# Patient Record
Sex: FEMALE | Race: WHITE | NOT HISPANIC OR LATINO | Employment: FULL TIME | URBAN - METROPOLITAN AREA
[De-identification: names, ages, dates, MRNs, and addresses within clinical notes are randomized per-mention and may not be internally consistent; named-entity substitution may affect disease eponyms.]

---

## 2023-02-01 ENCOUNTER — OFFICE VISIT (OUTPATIENT)
Dept: URGENT CARE | Facility: CLINIC | Age: 23
End: 2023-02-01
Payer: COMMERCIAL

## 2023-02-01 VITALS
TEMPERATURE: 98 F | RESPIRATION RATE: 18 BRPM | WEIGHT: 135 LBS | DIASTOLIC BLOOD PRESSURE: 73 MMHG | HEIGHT: 60 IN | HEART RATE: 75 BPM | OXYGEN SATURATION: 100 % | BODY MASS INDEX: 26.5 KG/M2 | SYSTOLIC BLOOD PRESSURE: 111 MMHG

## 2023-02-01 DIAGNOSIS — R52 GENERALIZED BODY ACHES: ICD-10-CM

## 2023-02-01 DIAGNOSIS — Z11.59 SCREENING FOR VIRAL DISEASE: ICD-10-CM

## 2023-02-01 DIAGNOSIS — R09.81 COUGH WITH CONGESTION OF PARANASAL SINUS: ICD-10-CM

## 2023-02-01 DIAGNOSIS — Z11.52 ENCOUNTER FOR SCREENING FOR COVID-19: ICD-10-CM

## 2023-02-01 DIAGNOSIS — R05.8 COUGH WITH CONGESTION OF PARANASAL SINUS: ICD-10-CM

## 2023-02-01 DIAGNOSIS — R68.83 CHILLS (WITHOUT FEVER): ICD-10-CM

## 2023-02-01 DIAGNOSIS — J06.9 VIRAL URI WITH COUGH: Primary | ICD-10-CM

## 2023-02-01 DIAGNOSIS — Z77.120 MOLD SUSPECTED EXPOSURE: ICD-10-CM

## 2023-02-01 LAB
CTP QC/QA: YES
CTP QC/QA: YES
HETEROPH AB SER QL: NEGATIVE
SARS-COV-2 AG RESP QL IA.RAPID: NEGATIVE

## 2023-02-01 PROCEDURE — 1159F PR MEDICATION LIST DOCUMENTED IN MEDICAL RECORD: ICD-10-PCS | Mod: CPTII,S$GLB,, | Performed by: PHYSICIAN ASSISTANT

## 2023-02-01 PROCEDURE — 87811 SARS-COV-2 COVID19 W/OPTIC: CPT | Mod: QW,S$GLB,, | Performed by: PHYSICIAN ASSISTANT

## 2023-02-01 PROCEDURE — 99204 PR OFFICE/OUTPT VISIT, NEW, LEVL IV, 45-59 MIN: ICD-10-PCS | Mod: S$GLB,,, | Performed by: PHYSICIAN ASSISTANT

## 2023-02-01 PROCEDURE — 3078F PR MOST RECENT DIASTOLIC BLOOD PRESSURE < 80 MM HG: ICD-10-PCS | Mod: CPTII,S$GLB,, | Performed by: PHYSICIAN ASSISTANT

## 2023-02-01 PROCEDURE — 3074F PR MOST RECENT SYSTOLIC BLOOD PRESSURE < 130 MM HG: ICD-10-PCS | Mod: CPTII,S$GLB,, | Performed by: PHYSICIAN ASSISTANT

## 2023-02-01 PROCEDURE — 86308 POCT INFECTIOUS MONONUCLEOSIS: ICD-10-PCS | Mod: QW,S$GLB,, | Performed by: PHYSICIAN ASSISTANT

## 2023-02-01 PROCEDURE — 86308 HETEROPHILE ANTIBODY SCREEN: CPT | Mod: QW,S$GLB,, | Performed by: PHYSICIAN ASSISTANT

## 2023-02-01 PROCEDURE — 3078F DIAST BP <80 MM HG: CPT | Mod: CPTII,S$GLB,, | Performed by: PHYSICIAN ASSISTANT

## 2023-02-01 PROCEDURE — 3074F SYST BP LT 130 MM HG: CPT | Mod: CPTII,S$GLB,, | Performed by: PHYSICIAN ASSISTANT

## 2023-02-01 PROCEDURE — 3008F PR BODY MASS INDEX (BMI) DOCUMENTED: ICD-10-PCS | Mod: CPTII,S$GLB,, | Performed by: PHYSICIAN ASSISTANT

## 2023-02-01 PROCEDURE — 1159F MED LIST DOCD IN RCRD: CPT | Mod: CPTII,S$GLB,, | Performed by: PHYSICIAN ASSISTANT

## 2023-02-01 PROCEDURE — 99204 OFFICE O/P NEW MOD 45 MIN: CPT | Mod: S$GLB,,, | Performed by: PHYSICIAN ASSISTANT

## 2023-02-01 PROCEDURE — 87811 SARS CORONAVIRUS 2 ANTIGEN POCT, MANUAL READ: ICD-10-PCS | Mod: QW,S$GLB,, | Performed by: PHYSICIAN ASSISTANT

## 2023-02-01 PROCEDURE — 3008F BODY MASS INDEX DOCD: CPT | Mod: CPTII,S$GLB,, | Performed by: PHYSICIAN ASSISTANT

## 2023-02-01 NOTE — PATIENT INSTRUCTIONS
PLEASE READ YOUR DISCHARGE INSTRUCTIONS ENTIRELY AS IT CONTAINS IMPORTANT INFORMATION.    -Please call [facility] scheduling center [phone] to set up appointment for PCP/specialty clinic for continued workup and management.  Referral was placed for evaluation with Allergy.      Patient had covid testing done today.    Discussed corona virus precautions and reviewed [facility] FAC; printed a copy for patient.  I discussed to continue to monitor their symptoms. Discussed that if their symptoms persist or worsen to seek re-evaluation. Clinic vs. ER precautions were given.  Patient verbalized understanding and agreed with the entire plan of care.    If Negative and no direct exposure: symptom free without fever reducing meds in 24 hours - can go back to work in 24 hours with surgical mask for 10-14 days.      - Reviewed radiographs and all diagnostic testing with patient/family.    - Rest.  Drink plenty of fluids.    - Tylenol OR anti-inflammatory (NSAIDs, ibuprofen, aleve, motrin) as directed as needed for fever/pain.  For Tylenol, do not exceed 3000 mg/ day. If no contraindication or allergies.  -OK to supplement with OTC DayQuil, NyQuil or TheraFlu every 6 hours as needed for cough and congestion.  Use caution of total amount of Tylenol/acetaminophen per day.      -Below are suggestions for symptomatic relief:              -Salt water gargles to soothe throat pain.              -Chloroseptic spray also helps to numb throat pain. Drink hot tea with honey or lemon to soothe your throat.              -Nasal saline spray reduces inflammation and dryness.              -Warm face compresses to help with facial sinus pain/pressure.              -Vicks vapor rub at night.              -**may also supplement with OTC nasal spray to help with inflammation and congestion.   Wean to off when you nose becomes to dry or bleed. Also use nasal saline twice a day to help with dryness.               -Flonase OTC or Nasacort OTC  once or  twice a day for nasal/sinus congestion. DON'T USE IF YOU HAVE GLAUCOMA. CHECK WITH YOUR PHARMACIST/PHYSICIAN.              -Simple foods like chicken noodle soup.              -Mucinex DM (ANY COUGH EXPECTORANT-- guaifenesin) for cough or chest congestion with mucus and (ANY COUGH SUPPRESSANT- dextromethorphan) helps with coughing every 12 hours. Mucinex-DM if you have chest congestion or sputum (caution if history of high blood pressure or palpitations).              -Zyrtec/Claritin/xyzal during the day time  & Benadryl at night (only if severe runny nose) may help with allergies and runny nose. Add decongestant if you have nasal/sinus congestion/sinus pressure/ear fullness sensation. (see below)              -may take OTC meclizine as needed for dizziness or nausea.     Caution with use of Decongestant meds:  -Do not combine pseudophed or phenylephrine with any other brand allergy-D for DECONGESTANT.   -Or vice versa, you can you take plain allergy medications (allegra/claritin/zyrtec with NO Decongestant) and ADD OTC pseudophed or phenelyphrine 3 times a day (or every 4-6 hours needed). Avoid taking decongestant late at night or with caffeine as it can keep you up or cause jittery feeling.     -If you DO have Hypertension , anxiety, or palpitations, it is safe to take Coricidin HBP for relief of cough, congestion, or sinus symptoms every 4-6 hours.      For your GI symptoms:  -Use gatorade/pedialyte or rehydration packets to help stay hydrated. Vitamin water and plain water do not contain rehydrating electrolytes.  -Increase clear liquids (water, gatorade, pedialyte, broths, jello, etc) Hold off on solids for 12-18 hours. Then advance to BRAT diet (banana, rice, applesauce, tea, toast/crackers), then advance further as tolerated. Avoid spicy or fatty foods.       -Please go to the ER if you experience worsening abdominal pain, blood in your vomit or stool, high fever, dizziness, fainting, swelling of your  abdomen, inability to pass gas or stool, or inability to urinate.     -You must understand that you've received an Urgent Care treatment only and that you may be released before all your medical problems are known or treated. You, the patient, will arrange for follow up care as instructed. Please arrange follow up with your primary medical clinic within 2-5 days if your signs and symptoms have not resolved or worsen.     - Follow up with your PCP or specialty clinic as directed.  You can call [phone] or [phone] to schedule an appointment with the appropriate provider.  Schedule CENTER is open Mon-Friday 8-5pm (excluded holidays).    - If your condition worsens or fails to improve we recommend that you receive another evaluation at the emergency room immediately or contact your primary medical clinic to discuss your concerns.

## 2023-02-01 NOTE — LETTER
[address] [facility] ? [place], [address] ? Phone [phone] ? Fax [phone]           Return to Work/School    Patient: Kimi Manning  YOB: 2000   Date: 02/01/2023      To Whom It May Concern:     Kimi Manning was in contact with/seen in my office on 02/01/2023. COVID-19 is present in our communities across the state. Not all patients are eligible or appropriate to be tested. In this situation, your employee meets the following criteria:     Kimi Manning has met the criteria for COVID-19 testing and has a NEGATIVE result. The employee can return to work once they are asymptomatic for 24 hours without the use of fever reducing medications (Tylenol, Motrin, etc).  Okay to return 02/02/2023.     If you have any questions or concerns, or if I can be of further assistance, please do not hesitate to contact me.     Sincerely,    Mike Howell PA-C

## 2023-03-03 ENCOUNTER — TELEPHONE (OUTPATIENT)
Dept: ADMINISTRATIVE | Facility: HOSPITAL | Age: 23
End: 2023-03-03
Payer: COMMERCIAL

## 2023-08-15 ENCOUNTER — HOSPITAL ENCOUNTER (EMERGENCY)
Facility: HOSPITAL | Age: 23
Discharge: PSYCHIATRIC OR SUBSTANCE USE FACILITY | End: 2023-08-15
Attending: EMERGENCY MEDICINE
Payer: COMMERCIAL

## 2023-08-15 VITALS
WEIGHT: 122 LBS | HEART RATE: 86 BPM | RESPIRATION RATE: 18 BRPM | SYSTOLIC BLOOD PRESSURE: 126 MMHG | DIASTOLIC BLOOD PRESSURE: 82 MMHG | BODY MASS INDEX: 23.83 KG/M2 | TEMPERATURE: 98 F | OXYGEN SATURATION: 95 %

## 2023-08-15 DIAGNOSIS — F22 DELUSIONAL DISORDER: ICD-10-CM

## 2023-08-15 DIAGNOSIS — R45.851 SUICIDAL IDEATION: ICD-10-CM

## 2023-08-15 DIAGNOSIS — R46.2 BIZARRE BEHAVIOR: Primary | ICD-10-CM

## 2023-08-15 LAB
ALBUMIN SERPL BCP-MCNC: 4.7 G/DL (ref 3.5–5.2)
ALP SERPL-CCNC: 68 U/L (ref 55–135)
ALT SERPL W/O P-5'-P-CCNC: 16 U/L (ref 10–44)
AMPHET+METHAMPHET UR QL: NEGATIVE
ANION GAP SERPL CALC-SCNC: 10 MMOL/L (ref 8–16)
APAP SERPL-MCNC: <3 UG/ML (ref 10–20)
AST SERPL-CCNC: 21 U/L (ref 10–40)
B-HCG UR QL: NEGATIVE
BARBITURATES UR QL SCN>200 NG/ML: NEGATIVE
BASOPHILS # BLD AUTO: 0.06 K/UL (ref 0–0.2)
BASOPHILS NFR BLD: 0.8 % (ref 0–1.9)
BENZODIAZ UR QL SCN>200 NG/ML: NEGATIVE
BILIRUB SERPL-MCNC: 0.7 MG/DL (ref 0.1–1)
BILIRUB UR QL STRIP: NEGATIVE
BUN SERPL-MCNC: 7 MG/DL (ref 6–20)
BZE UR QL SCN: NEGATIVE
CALCIUM SERPL-MCNC: 9.5 MG/DL (ref 8.7–10.5)
CANNABINOIDS UR QL SCN: NEGATIVE
CHLORIDE SERPL-SCNC: 106 MMOL/L (ref 95–110)
CLARITY UR: CLEAR
CO2 SERPL-SCNC: 24 MMOL/L (ref 23–29)
COLOR UR: COLORLESS
CREAT SERPL-MCNC: 0.9 MG/DL (ref 0.5–1.4)
CREAT UR-MCNC: 54.7 MG/DL (ref 15–325)
CTP QC/QA: YES
DIFFERENTIAL METHOD: NORMAL
EOSINOPHIL # BLD AUTO: 0.1 K/UL (ref 0–0.5)
EOSINOPHIL NFR BLD: 1.3 % (ref 0–8)
ERYTHROCYTE [DISTWIDTH] IN BLOOD BY AUTOMATED COUNT: 12.9 % (ref 11.5–14.5)
EST. GFR  (NO RACE VARIABLE): >60 ML/MIN/1.73 M^2
ETHANOL SERPL-MCNC: <10 MG/DL
GLUCOSE SERPL-MCNC: 100 MG/DL (ref 70–110)
GLUCOSE UR QL STRIP: NEGATIVE
HCT VFR BLD AUTO: 38.2 % (ref 37–48.5)
HGB BLD-MCNC: 12.9 G/DL (ref 12–16)
HGB UR QL STRIP: NEGATIVE
IMM GRANULOCYTES # BLD AUTO: 0.01 K/UL (ref 0–0.04)
IMM GRANULOCYTES NFR BLD AUTO: 0.1 % (ref 0–0.5)
KETONES UR QL STRIP: ABNORMAL
LEUKOCYTE ESTERASE UR QL STRIP: NEGATIVE
LYMPHOCYTES # BLD AUTO: 1.6 K/UL (ref 1–4.8)
LYMPHOCYTES NFR BLD: 23.1 % (ref 18–48)
MCH RBC QN AUTO: 29.9 PG (ref 27–31)
MCHC RBC AUTO-ENTMCNC: 33.8 G/DL (ref 32–36)
MCV RBC AUTO: 88 FL (ref 82–98)
METHADONE UR QL SCN>300 NG/ML: NEGATIVE
MONOCYTES # BLD AUTO: 0.4 K/UL (ref 0.3–1)
MONOCYTES NFR BLD: 5 % (ref 4–15)
NEUTROPHILS # BLD AUTO: 4.9 K/UL (ref 1.8–7.7)
NEUTROPHILS NFR BLD: 69.7 % (ref 38–73)
NITRITE UR QL STRIP: NEGATIVE
NRBC BLD-RTO: 0 /100 WBC
OPIATES UR QL SCN: NEGATIVE
PCP UR QL SCN>25 NG/ML: NEGATIVE
PH UR STRIP: 7 [PH] (ref 5–8)
PLATELET # BLD AUTO: 234 K/UL (ref 150–450)
PMV BLD AUTO: 9.5 FL (ref 9.2–12.9)
POTASSIUM SERPL-SCNC: 3.4 MMOL/L (ref 3.5–5.1)
PROT SERPL-MCNC: 7.6 G/DL (ref 6–8.4)
PROT UR QL STRIP: NEGATIVE
RBC # BLD AUTO: 4.32 M/UL (ref 4–5.4)
SODIUM SERPL-SCNC: 140 MMOL/L (ref 136–145)
SP GR UR STRIP: 1.01 (ref 1–1.03)
TOXICOLOGY INFORMATION: NORMAL
TSH SERPL DL<=0.005 MIU/L-ACNC: 2.15 UIU/ML (ref 0.4–4)
URN SPEC COLLECT METH UR: ABNORMAL
UROBILINOGEN UR STRIP-ACNC: NEGATIVE EU/DL
WBC # BLD AUTO: 7.06 K/UL (ref 3.9–12.7)

## 2023-08-15 PROCEDURE — 81025 URINE PREGNANCY TEST: CPT | Performed by: EMERGENCY MEDICINE

## 2023-08-15 PROCEDURE — 80307 DRUG TEST PRSMV CHEM ANLYZR: CPT | Performed by: EMERGENCY MEDICINE

## 2023-08-15 PROCEDURE — 99205 PR OFFICE/OUTPT VISIT, NEW, LEVL V, 60-74 MIN: ICD-10-PCS | Mod: 95,,, | Performed by: PSYCHIATRY & NEUROLOGY

## 2023-08-15 PROCEDURE — 99285 EMERGENCY DEPT VISIT HI MDM: CPT

## 2023-08-15 PROCEDURE — 81003 URINALYSIS AUTO W/O SCOPE: CPT | Performed by: EMERGENCY MEDICINE

## 2023-08-15 PROCEDURE — 82077 ASSAY SPEC XCP UR&BREATH IA: CPT | Performed by: EMERGENCY MEDICINE

## 2023-08-15 PROCEDURE — 84443 ASSAY THYROID STIM HORMONE: CPT | Performed by: EMERGENCY MEDICINE

## 2023-08-15 PROCEDURE — 99205 OFFICE O/P NEW HI 60 MIN: CPT | Mod: 95,,, | Performed by: PSYCHIATRY & NEUROLOGY

## 2023-08-15 PROCEDURE — 80143 DRUG ASSAY ACETAMINOPHEN: CPT | Performed by: EMERGENCY MEDICINE

## 2023-08-15 PROCEDURE — 80053 COMPREHEN METABOLIC PANEL: CPT | Performed by: EMERGENCY MEDICINE

## 2023-08-15 PROCEDURE — 85025 COMPLETE CBC W/AUTO DIFF WBC: CPT | Performed by: EMERGENCY MEDICINE

## 2023-08-15 NOTE — CONSULTS
"  Consults  Consult Start Time: 08/15/2023 11:00 CDT  Consult End Time: 08/15/2023 12:00 CDT          Tele-Consultation to Emergency Department from Psychiatry      Patient agreeable to consultation via telepsychiatry.    Start time of consultation: 11:00 am    The chief complaint leading to psychiatric consultation is: psychosis  This consultation is from the Emergency Department attending physician Dr. Basilio Sauer.   The location of the consulting psychiatrist is [address] [place] [address].  The patient location is [facility].     Patient Identification:  Kimi Joaquin is a 23 y.o. female.    Patient information was obtained from patient.    History of Present Illness:    From current presentation:  "Kimi Joaquin is a 23 y.o. female with a PMHx of depression, who presents to the ED for evaluation of suicidal ideations onset 2 weeks ago. Psych health professionals from [facility] also c/o patient having tangential thoughts and bizarre behavior. Via psych health professionals, patient has been messaging bizarre thoughts and expressing suicidal ideations in the [facility] group chat. Patient states she has bilateral arm pain due to her apple watch, and hears "zapping." Notes "zapping" is "my ears buzzing in an electric [employer]undai. It's a crossover." States it "hurts to hear." Reports breaking up with her ex and beginning to journal more. Mentions today she does not have any suicidal ideations and is unsure why professionals sent her here. She [place] [employer]. For more hx on the patient contact Efrain Rivera, [phone]. Patient denies cough, shortness of breath, chest pain, fever, chills, abdominal pain, nausea, vomiting, diarrhea, dysuria, headaches, congestion, sore throat, arm or leg trouble, eye pain, ear pain, rash, or other associated symptoms. HPI limited due to the psychiatric condition of the patient."    On interview by me today:  Thought process is disorganized.  States that apple watch " "kept shocking her.  Knows date and day of the week.  No recent prescribed medication.   Denies SI/HI/AH's.  Asks "Am I 3 fifths of a person or am I 5 thirds of a person".    Efrain Rivera, [phone]: pt. Is tangential/circumstantial, last week was isolated, cried; pt. Today reported SI for the past 2 weeks;    Psychiatric History:   Hospitalization: denies  Medication Trials: Strattera  Suicide Attempts: denies  Violence: denies    Review of Systems:  Currently denies any physical complaint    Past Medical History: History reviewed. No pertinent past medical history.     Seizures: denies    Allergies:   Review of patient's allergies indicates:  No Known Allergies    Medications in ER: Medications - No data to display    Substance Abuse History:   Alchohol: occasional small amount  Drug: denies    Social History:   Children: denies    Current Evaluation:     Constitutional  Vitals:  Vitals:    08/15/23 0938   BP: 135/75   Pulse: 88   Resp: 18   Temp: 99.1 °F (37.3 °C)   TempSrc: Oral   SpO2: 97%   Weight: 55.3 kg (122 lb)      General:  unremarkable, age appropriate     Musculoskeletal  Muscle Strength/Tone:   moving arms normally   Gait & Station:   sitting on stretcher     Psychiatric  Level of Consciousness: alert  Orientation: oriented to person, place and time  Grooming: in hospital clothing  Psychomotor Behavior: no agitation  Speech: normal in rate, rhythm and volume  Language: uses words appropriately  Mood: "I feel grounded in reality]  Affect: at one point smiles and cries at the same time  Thought Process: disorganized  Associations: loose  Thought Content: denies SI/HI  Memory: grossly intact  Attention: intact to interview  Insight: appears limited  Judgement: appears limited    Relevant Elements of Neurological Exam: no abnormality of posture noted    Assessment - Diagnosis - Goals:     Diagnosis/Impression:   Reported SI  Psychosis, unspecified    Rec:   - obtain urine pregnancy test  - medical " clearance  - PEC and psychiatric hospitalization  - no standing psychotropic medication for now  - Haldol/Benadryl/Ativan PO/IM PRN for agitation  - follow EKG/QTc if pt. Receives Haldol    Total time, including chart review, interview of the patient, obtaining collateral info[if possible]: 60 min    Laboratory Data:   Labs Reviewed   COMPREHENSIVE METABOLIC PANEL - Abnormal; Notable for the following components:       Result Value    Potassium 3.4 (*)     All other components within normal limits   URINALYSIS, REFLEX TO URINE CULTURE - Abnormal; Notable for the following components:    Color, UA Colorless (*)     Ketones, UA 1+ (*)     All other components within normal limits    Narrative:     Specimen Source->Urine   CBC W/ AUTO DIFFERENTIAL   ALCOHOL,MEDICAL (ETHANOL)   TSH   DRUG SCREEN PANEL, URINE EMERGENCY   ACETAMINOPHEN LEVEL

## 2023-08-15 NOTE — ED PROVIDER NOTES
"Encounter Date: 8/15/2023    SCRIBE #1 NOTE: I, Checo Anna, am scribing for, and in the presence of,  Basilio Sauer MD. I have scribed the following portions of the note - Other sections scribed: HPI, ROS.       History     Chief Complaint   Patient presents with    Arm Injury     Pt reports arm pain from shock of apple watch to left arm, Pt is accompanied by psych health professionals from the [facility] that have noticed bizarre behavior that has been increasing over the last 2 weeks.  Strange postings and delusional behavior.  Pt has a hx of depression but not on medication.      Kimi Joaquin is a 23 y.o. female with a PMHx of depression, brought to the ED by [facility] psych health professionals for evaluation of bizarre behavior onset 2 weeks ago. Psych health professionals also note patient having tangential thoughts and suicidal ideations. Via health professionals, patient has been messaging bizarre thoughts and expressing suicidal ideations in the [facility] group chat. Patient states she has bilateral arm pain due to her apple watch, and hears "zapping." Notes "zapping" is "my ears buzzing in an electric Hyundai. It's a crossover." States it "hurts to hear." Reports breaking up with her ex and beginning to journal more. Mentions today she does not have any suicidal ideations and is unsure why professionals sent her here. She works [employer] the [employer]. Patient denies cough, shortness of breath, chest pain, fever, chills, abdominal pain, nausea, vomiting, diarrhea, dysuria, headaches, congestion, sore throat, arm or leg trouble, eye pain, ear pain, rash, or other associated symptoms. HPI limited due to the psychiatric disorder.     For hx on the patient contact Efrain Rivera, [phone].        The history is provided by the patient. The history is limited by the condition of the patient. No  was used.   Psych  Review of patient's allergies indicates:  No Known Allergies  History reviewed. No pertinent " past medical history.  History reviewed. No pertinent surgical history.  History reviewed. No pertinent family history.  Social History     Tobacco Use    Smoking status: Never    Smokeless tobacco: Never   Substance Use Topics    Alcohol use: Never    Drug use: Never     Review of Systems   Constitutional:  Negative for chills and fever.        (+) bizarre behavior   HENT:  Negative for congestion, ear pain and sore throat.    Eyes:  Negative for pain.   Respiratory:  Negative for cough and shortness of breath.    Cardiovascular:  Negative for chest pain.   Gastrointestinal:  Negative for abdominal pain, diarrhea, nausea and vomiting.   Genitourinary:  Negative for dysuria.   Musculoskeletal:         (-) Arm or leg trouble.    Skin:  Negative for rash.   Neurological:  Negative for headaches.   Psychiatric/Behavioral:  Positive for suicidal ideas.         (+) tangential thoughts       Physical Exam     Initial Vitals [08/15/23 0938]   BP Pulse Resp Temp SpO2   135/75 88 18 99.1 °F (37.3 °C) 97 %      MAP       --         Physical Exam  The patient was examined specifically for the following:   General:No significant distress, Good color, Warm and dry. Head and neck:Scalp atraumatic, Neck supple. Neurological:Appropriate conversation, Gross motor deficits. Eyes:Conjugate gaze, Clear corneas. ENT: No epistaxis. Cardiac: Regular rate and rhythm, Grossly normal heart tones. Pulmonary: Wheezing, Rales. Gastrointestinal: Abdominal tenderness, Abdominal distention. Musculoskeletal: Extremity deformity, Apparent pain with range of motion of the joints. Skin: Rash.   The findings on examination were normal except for the following:  The patient seems to be somewhat tangential in her thoughts.  She denies being suicidal or homicidal now.  Reports that she sometimes hears voices when she is wearing had sets.  She complains of zapping which means electric shocks in the right ear and the arms and legs.  The seem to disturb  her.  She relates this to driving her hybrid Hyundai.  ED Course   Procedures  Labs Reviewed   COMPREHENSIVE METABOLIC PANEL - Abnormal; Notable for the following components:       Result Value    Potassium 3.4 (*)     All other components within normal limits   URINALYSIS, REFLEX TO URINE CULTURE - Abnormal; Notable for the following components:    Color, UA Colorless (*)     Ketones, UA 1+ (*)     All other components within normal limits    Narrative:     Specimen Source->Urine   ACETAMINOPHEN LEVEL - Abnormal; Notable for the following components:    Acetaminophen (Tylenol), Serum <3.0 (*)     All other components within normal limits   CBC W/ AUTO DIFFERENTIAL   TSH   DRUG SCREEN PANEL, URINE EMERGENCY    Narrative:     Specimen Source->Urine   ALCOHOL,MEDICAL (ETHANOL)   POCT URINE PREGNANCY          Imaging Results    None          Medications - No data to display  Medical Decision Making:   History:   Old Medical Records: I decided to obtain old medical records.  Old Records Summarized: records from previous admission(s), records from another hospital and other records.  Initial Assessment:   Additional history is provided by independent historian: [employer] psych health professionals.     Clinical Tests:   Lab Tests: Ordered and Reviewed  Given the above, this patient presents to the emergency room reporting that she is being shocked by her electric car.  Her therapist is noted bizarre behavior increasing over the course of the last 2 weeks.  There has been delusional behavior.  There have been group text messages where she has threatened suicide.  The patient was evaluated by Psychiatry today and was thought appropriate for inpatient psychiatric admission.  A pec was completed.  Medical clearance examination was performed.  I could find no convincing organic disease.  This patient is medically clear for psychiatric admission.  I will place her to Psychiatry.        Scribe Attestation:   Scribe #1: I performed  the above scribed service and the documentation accurately describes the services I performed. I attest to the accuracy of the note.           Medically cleared for psychiatry placement: 8/15/2023  1:01 PM  I personally performed the services described in this documentation.  All medical record  entries made by the scribe are at my direction and in my presence.  Signed, Dr. Sauer       Clinical Impression:   Final diagnoses:  [R46.2] Bizarre behavior (Primary)  [F22] Delusional disorder  [R45.851] Suicidal ideation        ED Disposition Condition    Transfer to Psych Facility Stable          ED Prescriptions    None       Follow-up Information    None          Basilio Sauer MD  08/15/23 2796

## 2023-08-15 NOTE — ED NOTES
"Kimi Cheri Joaquin, a 23 y.o. female [place] [facility]  reporting patient experiencing bizarre behavior int he last 2 weeks. Patient with no PMHx and not on any medications noting to have multiple episodes of inappropriate laughter alone as well as tangential thoughts and speech in the last two weeks. , Efrain Rivera, also states patient not getting enough sleep at night and would [place] in the middle of the night saying, "good night everyone, I will be leaving soon" at 0200. Patient with flight of ideas. Currently denies SI/HI or VH/AH. Patient states she has arm pain from apple watch shocking her. Patient calm and cooperative noticed by ED staff to be dancing and laughing by herself in the room.     Triage note:  Chief Complaint   Patient presents with    Arm Injury     Pt reports arm pain from shock of apple watch to left arm, Pt is accompanied by psych health professionals from the [facility] that have noticed bizarre behavior that has been increasing over the last 2 weeks.  Strange postings and delusional behavior.  Pt has a hx of depression but not on medication.      Review of patient's allergies indicates:  No Known Allergies  History reviewed. No pertinent past medical history.    "

## 2023-08-16 PROBLEM — R46.2 BIZARRE BEHAVIOR: Status: ACTIVE | Noted: 2023-08-16

## 2023-08-16 PROBLEM — E87.6 HYPOKALEMIA: Status: ACTIVE | Noted: 2023-08-16

## 2023-08-16 PROBLEM — Z13.9 ENCOUNTER FOR MEDICAL SCREENING EXAMINATION: Status: ACTIVE | Noted: 2023-08-16

## 2023-08-26 PROBLEM — F29 PSYCHOSIS: Status: ACTIVE | Noted: 2023-08-26

## 2023-08-30 PROBLEM — F20.81 SCHIZOPHRENIFORM DISORDER: Status: ACTIVE | Noted: 2023-08-30

## 2023-09-05 ENCOUNTER — HOSPITAL ENCOUNTER (OUTPATIENT)
Dept: PSYCHIATRY | Facility: HOSPITAL | Age: 23
Discharge: HOME | End: 2023-09-05
Payer: OTHER GOVERNMENT

## 2023-09-05 DIAGNOSIS — F20.81 SCHIZOPHRENIFORM DISORDER: Primary | ICD-10-CM

## 2023-09-05 PROCEDURE — 90853 GROUP PSYCHOTHERAPY: CPT

## 2023-09-05 PROCEDURE — 90853 GROUP PSYCHOTHERAPY: CPT | Mod: ,,, | Performed by: PSYCHOLOGIST

## 2023-09-05 PROCEDURE — 90853 PR GROUP PSYCHOTHERAPY: ICD-10-PCS | Mod: ,,, | Performed by: PSYCHOLOGIST

## 2023-09-05 PROCEDURE — 90792 PSYCH DIAG EVAL W/MED SRVCS: CPT | Mod: ,,, | Performed by: STUDENT IN AN ORGANIZED HEALTH CARE EDUCATION/TRAINING PROGRAM

## 2023-09-05 PROCEDURE — 90792 PR PSYCHIATRIC DIAGNOSTIC EVALUATION W/MEDICAL SERVICES: ICD-10-PCS | Mod: ,,, | Performed by: STUDENT IN AN ORGANIZED HEALTH CARE EDUCATION/TRAINING PROGRAM

## 2023-09-05 NOTE — TREATMENT PLAN
"[facility]  MENTAL WELLNESS PROGRAM  INTERDISCIPLINARY TREATMENT PLAN  INTENSIVE OUTPATIENT     INTERDISCIPLINARY  TREATMENT TEAM:    Fiorella Metz M.D., Psychiatrist  Blaise Arango M.D., Psychiatrist     Maggy Caruso, Ph.D., Clinical Psychologist  Crystal Kerr, SW, Licensed Master Social Worker  DANYA CarranzaW, Registered   Yuriy Fields LPN, Licensed Practical Nurse      Resident: Adams Hare MD     (Signatures scanned into record separately).      ESTIMATED LOS:  2 weeks      The patient has reviewed the treatment plan with staff and has signed the "Patient Responsibilities" form.    (Patient signature scanned into record separately).       TREATMENT PLAN    DIAGNOSIS: Unspecified psychotic disorder      Patient Education Needs/Barriers to Learning (i.e., Language, Reading, Comprehension): None  Support/Advocacy Services/Needs (i.e., Financial, Transportation, Medications): None  Community Resources (i.e., Alcoholics Anonymous, Al Anon): Aftercare group    Patients Identified Goals:  To be treated fairly   2.   Acceptance of who I truly am  3.   LGBTQ friendships  4.   Advice on life    Coping Skills:  Ludwig  2.  Journalling   3.   Baths  4.   Yoga    Strengths:  Compassion  2.   Merissa  3.   Ambition      Limitations:  Carpets  2.   Small living area  3.   Handwriting    Goals and Objectives:  1. Goal: Attend and participate in all groups   Objective measure: Progress notes indicating active listening,    self-disclosure, feedback   Time frame to reach goal: Each day    2. Goal: Medication management   Objective measure: Physician progress note indicating improved medication status   Time frame to reach goal: By discharge    3. Goal: Reduce depression   Objective measure: Physician progress note indicating depression is improved   Time frame to reach goal: By discharge    4.  Goal: Reduce anxiety   Objective measure: Physician progress note indicating anxiety is " improved   Time frame to reach goal: By discharge    5. Goal: Develop stress coping skills   Objective measure: Patient self-report of improved coping   Time frame to reach goal: By discharge    6.  Goal: Address health problems   Objective measure: Physician progress note regarding management of health problems   Time frame to reach goal: Within first week of treatment    7. Goal: Assess level of pain   Objective measure: Physician progress note regarding patient's self-reported pain   Time frame to reach goal: Within first week of treatment        Group Interventions:    Psychodynamic Group Psychotherapy - 1 hour, 5 times per week  Goals: 1. Utilize group empathy and support for problem solving; 2. Apply stress management, communication, and assertiveness skills to personal issues; 3. Discuss mental health symptoms and explore strategies for coping; 4. Discuss ways to change lifestyle to maintain better emotional health.    Communication Skills - 1 hour, 2 times per week  Goals: 1. Learn rules of effective communication; 2. Improve listening skills; 3. Practice clear communication.    Nutrition and Health - 1 hour, 1 time per week  Goals: 1. Explore impact that nutrition has on health; 2. Identify positive nutritional choices; 3. Explore how nutrition relates to stress tolerance.    Personal Growth - 1 hour, 2 times per week  Goals: 1. Discuss the development of self; 2. Create personal awareness and insight; 3. Explore a variety of psycho-educational techniques.    Promoting Healthy Lifestyles - 1 hour, 1 time per week  Goals: 1. Understand the Biopsychosocial Model of Health; 2. Develop insight into how mental health can impact other dimensions of health; 3. Develop appropriate health promotion strategies.    Acceptance and Commitment Therapy (ACT)- 1 hour, 1 time per week  Goals: 1. Learn an action-oriented psychotherapy called ACT; 2. Focus on identifying, challenging, and clarifying values systems and  beliefs; 3. Explore how values oriented actions can lead to emotional well-being.    Relationship Dynamics - 1 hour, 1 time per week  Goals: 1. Learn about factors that shape relationships; 2. Understand the central role of relationships in personal well-being; 3. Learn how to improve all relationships.    Relaxation Training - 1 hour, 3 times per week  Goals: 1. Learn about and implement various techniques for releasing physical tension from the body.    Spirituality - 1 hour, 1 time per week  Goals: 1. Discuss and reflect on the process of seeking peace and comfort; 2. Identify healthy ways of exploring spirituality.    Stress Management - 1 hour, 4 times per week  Goals: 1. Identify types and levels of stress; 2. Identify and change maladaptive beliefs and behaviors; 3. Identify and practice techniques of stress management.    DBT- 1 hour, 4 times per week  Goals: 1. Discuss emotion regulation and Interpersonal Effectiveness Skills and practice mindfulness; 2. Identify and change maladaptive beliefs and behaviors; Identify and practice techniques of DBT and mindfulness.

## 2023-09-05 NOTE — PLAN OF CARE
09/05/23 1402   Activity/Group Therapy Checklist   Group Activity   Attendance Attended   Follows Direction Followed directions   Group Interactions/Observations Interacted appropriately;Alert;Sharing;Supportive   Affect/Mood Range Normal range   Affect/Mood Display Appropriate   Goal Progression Progressing
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
## 2023-09-05 NOTE — PROGRESS NOTES
Group Psychotherapy (PhD/LCSW)    Site: [facility]    Clinical status of patient: Intensive Outpatient Program (IOP)    Date: 9/5/2023    Group Focus: Interpersonal Effectiveness     Length of service: 53742 - 45-50 minutes    Number of patients in attendance: 8    Referred by: Behavioral Medicine Unit Treatment Team    Target symptoms: Psychosis    Patient's response to treatment: Active Listening, Self-disclosure, and Feedback given to another patient    Progress toward goals: Progressing adequately    Interval History: Session focus was Interpersonal Effectiveness: DEAR MAN.  Patients were encouraged to exercise skillfulness during interactions by using this framework.    Diagnosis:     ICD-10-CM ICD-9-CM   1. Schizophreniform disorder  F20.81 295.40      Plan: Continue treatment on [facility]

## 2023-09-06 ENCOUNTER — HOSPITAL ENCOUNTER (OUTPATIENT)
Dept: PSYCHIATRY | Facility: HOSPITAL | Age: 23
Discharge: HOME | End: 2023-09-06
Attending: STUDENT IN AN ORGANIZED HEALTH CARE EDUCATION/TRAINING PROGRAM
Payer: OTHER GOVERNMENT

## 2023-09-06 VITALS
SYSTOLIC BLOOD PRESSURE: 98 MMHG | DIASTOLIC BLOOD PRESSURE: 63 MMHG | HEART RATE: 69 BPM | TEMPERATURE: 98 F | RESPIRATION RATE: 18 BRPM

## 2023-09-06 DIAGNOSIS — F20.81 SCHIZOPHRENIFORM DISORDER: Primary | ICD-10-CM

## 2023-09-06 PROCEDURE — 90853 PR GROUP PSYCHOTHERAPY: ICD-10-PCS | Mod: ,,, | Performed by: PSYCHOLOGIST

## 2023-09-06 PROCEDURE — 99232 PR SUBSEQUENT HOSPITAL CARE,LEVL II: ICD-10-PCS | Mod: ,,, | Performed by: STUDENT IN AN ORGANIZED HEALTH CARE EDUCATION/TRAINING PROGRAM

## 2023-09-06 PROCEDURE — 90853 GROUP PSYCHOTHERAPY: CPT | Mod: ,,, | Performed by: PSYCHOLOGIST

## 2023-09-06 PROCEDURE — 90853 GROUP PSYCHOTHERAPY: CPT

## 2023-09-06 PROCEDURE — 99232 SBSQ HOSP IP/OBS MODERATE 35: CPT | Mod: ,,, | Performed by: STUDENT IN AN ORGANIZED HEALTH CARE EDUCATION/TRAINING PROGRAM

## 2023-09-06 NOTE — PROGRESS NOTES
[facility] PROGRESS NOTE    PATIENT NAME: Kimi Joaquin  MRN: [id number]  ENCOUNTER DATE:  2023 9:22 AM   SITE:  U unit, [facility]  ADMIT DATE:   Pt Name: Kimi Joaquin   : 2000   MRN: [id number]      HISTORY OF PRESENTING ILLNESS:  Kimi Joaquin is a 23 y.o. female with history of schizophreniform disorder and depression who is admitted to BMU for IOP as step-down from recent inpatient psychiatric admission.    Reviewed notes since last seen by psychiatry.  Interacting appropriately in group.    Today, patient reports overall doing well.  Shares that worked on communication skills and that negotiation will be helpful for navigating relationships.  Notes main goal of trying to find more friends and to work on opening up to others.  Sleep intact.  Feels that energy is better.  Regular exercise.  Denies acute depressed mood, denies SI.   Continues to have poor insight into state of health and recent admission.  Addressed questions and concerns.      Risk Parameters:  Patient reports no suicidal ideation  Patient reports no homicidal ideation  Patient reports no self-injurious behavior  Patient reports no violent behavior    Current psych meds  Continue Perseris 120mg SubQ every 28 days (last given )  Medication side effects:  None    Current Substance use  Alcohol : None  Nicotine:  None  Illicit's: None  Other Rx controlled substances (Ex-opiates, stimulants etc): None      PAST PSYCHIATRIC, MEDICAL, AND SOCIAL HISTORY REVIEWED  The patient's past medical, family and social history have been reviewed and updated as appropriate within the electronic medical record     MEDICAL REVIEW OF SYSTEMS  History obtained from the patient  General : NO chills or fever  Respiratory: NO cough, shortness of breath  Cardiovascular: NO chest pain, palpitations or racing heart  Gastrointestinal: NO nausea, vomiting, constipation or diarrhea  Neurological: NO confusion, dizziness,  "headaches or tremors  Psychiatric: please see HPI     ALL MEDICATIONS:    Current Outpatient Medications:     [START ON 9/26/2023] risperiDONE 120 mg sers, Inject 120 mg into the skin every 28 days., Disp: 1 each, Rfl: 1    ALLERGIES:  Review of patient's allergies indicates:  No Known Allergies    RELEVANT LABS/STUDIES:    Lab Results   Component Value Date    WBC 7.06 08/15/2023    HGB 12.9 08/15/2023    HCT 38.2 08/15/2023    MCV 88 08/15/2023     08/15/2023     BMP  Lab Results   Component Value Date     08/15/2023    K 3.4 (L) 08/15/2023     08/15/2023    CO2 24 08/15/2023    BUN 7 08/15/2023    CREATININE 0.9 08/15/2023    CALCIUM 9.5 08/15/2023    ANIONGAP 10 08/15/2023     Lab Results   Component Value Date    ALT 16 08/15/2023    AST 21 08/15/2023    ALKPHOS 68 08/15/2023    BILITOT 0.7 08/15/2023     Lab Results   Component Value Date    TSH 2.152 08/15/2023     No results found for: "LABA1C", "HGBA1C"    VITALS  defer to nursing note    PHYSICAL EXAM  General: well developed, well nourished  Neurologic:   Gait: Normal   Psychomotor signs:  No involuntary movements or tremor  AIMS: none    PSYCHIATRIC EXAM:     Mental Status Exam:  Arousal: alert  Sensorium/Orientation: oriented to grossly intact  Behavior/Cooperation: cooperative   Speech: slowed, monotone  Language: grossly intact  Mood: neutral   Affect: flat  Thought Process: goal-directed  Thought Content:   Auditory hallucinations: NO  Visual hallucinations: NO  Paranoia: YES     Delusions:  Not spontaneously elicited  Suicidal ideation: NO  Homicidal ideation: NO  Attention/Concentration:  intact  Memory:    Recent:   Impaired to some degree   Remote: Intact  Fund of Knowledge: Intact   Abstract reasoning: concrete  Insight: poor awareness of illness  Judgment: behavior is adequate to circumstances       IMPRESSION:    Kimi Joaquin is a 23 y.o. female with history of schizophreniform disorder and depression who is admitted " to BMU for step-down care after recent inpatient psychiatric hospitalization.  Patient with poor insight into recent admission and symptoms that prompted inpatient management.    Status/Progress:  Based on the examination today, the patient's problem(s) is/are adequately but not ideally controlled.  New problems have not been presented today.     DIAGNOSES:  No diagnosis found.    PLAN:    1) Continue BMU program with group and individual therapies.     2) Psych Med:  Continue Perseris 120mg SubQ every 28 days (last given 8/29)    Discussed with patient informed consent, risks vs. benefits, alternative treatments, side effect profile and the inherent unpredictability of individual responses to these treatments. Answered any questions patient may have had. The patient expresses understanding of the above and displays the capacity to agree with this current plan     3) Other: Labs/medical problems/ collateral- none needed        Adams Hare MD  [facility], PGY-IV  9/6/2023 9:22 AM

## 2023-09-06 NOTE — PATIENT CARE CONFERENCE
Presenting Problem and History:  Kimi Joaquin is a 23 y.o. female with history of Schizophreniform disorder and depression who is admitted to BMU for step down care following recent inpatient psychiatric admission.  Patient admitted to [facility] 8/15-8/30 for SI and bizarre behavior and was discharged on Perseris.     On interview today, patient with poor memory and insight into recent admission.  Speech is slow and often repeats questions asked.  She recounts having trouble sleeping for 2 nights so her boss brought her to the emergency room.  When asked about circumstances only endorses trouble sleeping and low energy.  Asked about messages that might have been sent, and patient reports she got a new phone number and thinks people might have gotten her old phone number from [employer].  Reports overall doing well since discharge but focuses on frustration with having lower energy when boxing.  She denies acute depressed mood.  Future oriented and denies SI/HI/AVH.  Denies symptoms consistent with debra (DIGFAST).  She denies recent acute stressors or life changes.  Notes the primary time she gets anxious is worry about something happening to her when she is at the gym or out in the city due to general safety concerns.  Notes occasional alcohol use with at least a glass of wine weekly.  Denies substance use - denies cannabis, hallucinogens, stimulants.  Reports taking a number of supplements including Valerian root, ashwagandha, and flax.  Caffeine is 2 cups of coffee with mushroom extract daily.  Discussed IOP with patient.  She is uncertain what exactly she would like to work on while here.  Addressed questions and concerns.    Medications:  Perseris 120mg SubQ every 28 days for psychosis  PRN Melatonin    Diagnoses:  Unspecified Psychotic Disorder  Schizophreniform     Progress:  Patient was staffed by team. Pt initiated treatment on 9/5/23. Patient has been appropriate and cooperative in group so far. Team to monitor  Pt's progress.    Plan of Care:  Continue individual and group therapies    Aftercare/Follow ups:  Med Management: Dr. Razia Fox  Psychotherapy: TBD    Staff present:  MD Blaise Alves MD Eric Wilde, MD  Med Student  Maggy Caruso, PhD  Johny Osorio, PhD  Yuriy Fields, CATRACHITA Kerr LMSW

## 2023-09-06 NOTE — PLAN OF CARE
09/06/23 1323   Activity/Group Therapy Checklist   Group Activity   Attendance Attended   Follows Direction Followed directions   Group Interactions/Observations Interacted appropriately;Alert;Sharing;Supportive   Affect/Mood Range Normal range   Affect/Mood Display Appropriate   Goal Progression Progressing

## 2023-09-06 NOTE — PROGRESS NOTES
Group Psychotherapy (PhD/LCSW)    Site: [facility]    Clinical status of patient: Intensive Outpatient Program (IOP)    Date: 9/6/2023    Group Focus: Mindfulness     Length of service: 91791 - 45-50 minutes    Number of patients in attendance: 8    Referred by: Behavioral Medicine Unit Treatment Team    Target symptoms: Psychosis    Patient's response to treatment: Active Listening, Self-disclosure.    Progress toward goals: Progressing adequately    Interval History: INTRODUCTION TO MINDFULNESS: Session focus was Mindfulness: Introduction to Mindfulness and States of Mind. Patients were introduced to the practice of mindfulness. Patient discussed the two types of mindfulness practice and the efficacy of mindfulness as a skill to be used in conjunction with other DBT skills. Patients practiced identifying uses of reasonable mind, emotion mind, and synthesizing both to achieve a state of Wise mind.     Diagnosis:     ICD-10-CM ICD-9-CM   1. Schizophreniform disorder  F20.81 295.40     Plan: Continue treatment on [facility]

## 2023-09-07 ENCOUNTER — HOSPITAL ENCOUNTER (OUTPATIENT)
Dept: PSYCHIATRY | Facility: HOSPITAL | Age: 23
Discharge: HOME | End: 2023-09-07
Attending: STUDENT IN AN ORGANIZED HEALTH CARE EDUCATION/TRAINING PROGRAM
Payer: OTHER GOVERNMENT

## 2023-09-07 DIAGNOSIS — F20.81 SCHIZOPHRENIFORM DISORDER: Primary | ICD-10-CM

## 2023-09-07 PROCEDURE — 90853 PR GROUP PSYCHOTHERAPY: ICD-10-PCS | Mod: ,,, | Performed by: PSYCHOLOGIST

## 2023-09-07 PROCEDURE — 90853 GROUP PSYCHOTHERAPY: CPT | Mod: ,,, | Performed by: PSYCHOLOGIST

## 2023-09-07 PROCEDURE — 90853 GROUP PSYCHOTHERAPY: CPT

## 2023-09-07 PROCEDURE — 99232 PR SUBSEQUENT HOSPITAL CARE,LEVL II: ICD-10-PCS | Mod: ,,, | Performed by: STUDENT IN AN ORGANIZED HEALTH CARE EDUCATION/TRAINING PROGRAM

## 2023-09-07 PROCEDURE — 99232 SBSQ HOSP IP/OBS MODERATE 35: CPT | Mod: ,,, | Performed by: STUDENT IN AN ORGANIZED HEALTH CARE EDUCATION/TRAINING PROGRAM

## 2023-09-07 NOTE — PROGRESS NOTES
"[facility] PROGRESS NOTE    PATIENT NAME: Kimi Joaquin  MRN: [id number]  ENCOUNTER DATE:  2023 9:22 AM   SITE:  U unit, [facility]  ADMIT DATE:   Pt Name: Kimi Joaquin   : 2000   MRN: [id number]      HISTORY OF PRESENTING ILLNESS:  Kimi Joaquin is a 23 y.o. female with history of schizophreniform disorder and depression who is admitted to U for IOP as step-down from recent inpatient psychiatric admission.    Reviewed notes since last seen by psychiatry.  Interacting appropriately in group.    Today, patient reports doing well but with "mental exasperation".  Shares feeling tired and lethargic.  Enjoyed mindfulness group yesterday.  Stress about school and paying rent.  Spoke with commander yesterday about schedule of program.  Notes hard time meeting people to make friends.  Shares how when broke up with ex-fiance she also fell out with her best friend.  Spoke about difficulty when home because nephews don't have a mother and she gets treated as a surrogate mother when she is home.  Close with her brothers, shares how one sister just re-entered her life after struggling with addiction.  Denies personal substance use currently except for rare cannabis.  Endorses taking high doses of valerian root for the past year.  Explored family dynamics.  Denies acute depressed mood, denies SI.   Continues to have poor insight into state of health and recent admission.  Addressed questions and concerns.      Risk Parameters:  Patient reports no suicidal ideation  Patient reports no homicidal ideation  Patient reports no self-injurious behavior  Patient reports no violent behavior    Current psych meds  Continue Perseris 120mg SubQ every 28 days (last given )  Medication side effects:  None    Current Substance use  Alcohol : None  Nicotine:  None  Illicit's: None  Other Rx controlled substances (Ex-opiates, stimulants etc): None      PAST PSYCHIATRIC, MEDICAL, AND SOCIAL " "HISTORY REVIEWED  The patient's past medical, family and social history have been reviewed and updated as appropriate within the electronic medical record     MEDICAL REVIEW OF SYSTEMS  History obtained from the patient  General : NO chills or fever  Respiratory: NO cough, shortness of breath  Cardiovascular: NO chest pain, palpitations or racing heart  Gastrointestinal: NO nausea, vomiting, constipation or diarrhea  Neurological: NO confusion, dizziness, headaches or tremors  Psychiatric: please see HPI     ALL MEDICATIONS:    Current Outpatient Medications:     [START ON 9/26/2023] risperiDONE 120 mg sers, Inject 120 mg into the skin every 28 days., Disp: 1 each, Rfl: 1    ALLERGIES:  Review of patient's allergies indicates:  No Known Allergies    RELEVANT LABS/STUDIES:    Lab Results   Component Value Date    WBC 7.06 08/15/2023    HGB 12.9 08/15/2023    HCT 38.2 08/15/2023    MCV 88 08/15/2023     08/15/2023     BMP  Lab Results   Component Value Date     08/15/2023    K 3.4 (L) 08/15/2023     08/15/2023    CO2 24 08/15/2023    BUN 7 08/15/2023    CREATININE 0.9 08/15/2023    CALCIUM 9.5 08/15/2023    ANIONGAP 10 08/15/2023     Lab Results   Component Value Date    ALT 16 08/15/2023    AST 21 08/15/2023    ALKPHOS 68 08/15/2023    BILITOT 0.7 08/15/2023     Lab Results   Component Value Date    TSH 2.152 08/15/2023     No results found for: "LABA1C", "HGBA1C"    VITALS  defer to nursing note    PHYSICAL EXAM  General: well developed, well nourished  Neurologic:   Gait: Normal   Psychomotor signs:  No involuntary movements or tremor  AIMS: none    PSYCHIATRIC EXAM:     Mental Status Exam:  Arousal: alert  Sensorium/Orientation: oriented to grossly intact  Behavior/Cooperation: cooperative   Speech: slowed, monotone  Language: grossly intact  Mood: neutral   Affect: flat  Thought Process: goal-directed  Thought Content:   Auditory hallucinations: NO  Visual hallucinations: NO  Paranoia: YES   "   Delusions:  Not spontaneously elicited  Suicidal ideation: NO  Homicidal ideation: NO  Attention/Concentration:  intact  Memory:    Recent:   Impaired to some degree   Remote: Intact  Fund of Knowledge: Intact   Abstract reasoning: concrete  Insight: poor awareness of illness  Judgment: behavior is adequate to circumstances       IMPRESSION:    Kimi Joaquin is a 23 y.o. female with history of schizophreniform disorder and depression who is admitted to BMU for step-down care after recent inpatient psychiatric hospitalization.  Patient with poor insight into recent admission and symptoms that prompted inpatient management.    Status/Progress:  Based on the examination today, the patient's problem(s) is/are adequately but not ideally controlled.  New problems have not been presented today.     DIAGNOSES:  No diagnosis found.    PLAN:    1) Continue BMU program with group and individual therapies.     2) Psych Med:  Continue Perseris 120mg SubQ every 28 days (last given 8/29)    Discussed with patient informed consent, risks vs. benefits, alternative treatments, side effect profile and the inherent unpredictability of individual responses to these treatments. Answered any questions patient may have had. The patient expresses understanding of the above and displays the capacity to agree with this current plan     3) Other: Labs/medical problems/ collateral- none needed        Adams Hare MD  [facility], PGY-IV  9/7/2023 9:22 AM

## 2023-09-07 NOTE — PROGRESS NOTES
Group Psychotherapy (PhD/LCSW)    Site: [facility]    Clinical status of patient: Intensive Outpatient Program (IOP)    Date: 9/7/2023    Group Focus: Emotion Regulation     Length of service: 51838 - 45-50 minutes    Number of patients in attendance: 6    Referred by: Behavioral Medicine Unit Treatment Team    Target symptoms: Psychosis    Patient's response to treatment: Active Listening, Self-disclosure.    Progress toward goals: Progressing adequately    Interval History: Session focus was Emotion Regulation: Understanding Emotions.  Patients were encouraged to understand what their emotions do for them (motivate them to action, communicate to themselves and others).      Diagnosis:     ICD-10-CM ICD-9-CM   1. Schizophreniform disorder  F20.81 295.40     Plan: Continue treatment on [facility]

## 2023-09-07 NOTE — PLAN OF CARE
09/07/23 1216   Activity/Group Therapy Checklist   Group Goals/Reflection   Attendance Attended   Follows Direction Followed directions   Group Interactions/Observations Interacted appropriately;Alert;Sharing;Supportive   Affect/Mood Range Normal range   Affect/Mood Display Appropriate   Goal Progression Progressing

## 2023-09-08 ENCOUNTER — HOSPITAL ENCOUNTER (OUTPATIENT)
Dept: PSYCHIATRY | Facility: HOSPITAL | Age: 23
Discharge: HOME | End: 2023-09-08
Attending: STUDENT IN AN ORGANIZED HEALTH CARE EDUCATION/TRAINING PROGRAM
Payer: OTHER GOVERNMENT

## 2023-09-08 VITALS
HEART RATE: 88 BPM | DIASTOLIC BLOOD PRESSURE: 77 MMHG | SYSTOLIC BLOOD PRESSURE: 109 MMHG | TEMPERATURE: 98 F | RESPIRATION RATE: 18 BRPM

## 2023-09-08 DIAGNOSIS — F20.81 SCHIZOPHRENIFORM DISORDER: Primary | ICD-10-CM

## 2023-09-08 PROCEDURE — 99232 PR SUBSEQUENT HOSPITAL CARE,LEVL II: ICD-10-PCS | Mod: ,,, | Performed by: STUDENT IN AN ORGANIZED HEALTH CARE EDUCATION/TRAINING PROGRAM

## 2023-09-08 PROCEDURE — 99232 SBSQ HOSP IP/OBS MODERATE 35: CPT | Mod: ,,, | Performed by: STUDENT IN AN ORGANIZED HEALTH CARE EDUCATION/TRAINING PROGRAM

## 2023-09-08 NOTE — PROGRESS NOTES
Group Psychotherapy (PhD/LCSW)    Site: [facility]    Clinical status of patient: Intensive Outpatient Program (IOP)    Date: 9/8/2023    Group Focus: Psychodynamic Processing Group     Length of service: 96426 - 45-50 minutes    Number of patients in attendance: 6    Referred by: [facility]ENG    Target symptoms: Psychosis    Patient's response to treatment: Active Listening, Self-disclosure.    Progress toward goals: Progressing adequately    Interval History: Session focus was reflecting on skills developed achievements from the week. Group identified anticipated challenges in the upcoming weekend and identified coping ahead skills to help meet those challenges. Group members also identified SMART goals to work on during the weekend. Patient role played coping ahead skill of having a response prepared for people who ask her where she has been the last week when she return to work this weekend.     Diagnosis:     ICD-10-CM ICD-9-CM   1. Schizophreniform disorder  F20.81 295.40     Plan: Continue treatment on [facility]

## 2023-09-08 NOTE — PROGRESS NOTES
[facility] PROGRESS NOTE    PATIENT NAME: Kimi Joaquin  MRN: [id number]  ENCOUNTER DATE:  2023 9:22 AM   SITE:  U unit, [facility]  ADMIT DATE:   Pt Name: Kiim Joaquin   : 2000   MRN: [id number]      HISTORY OF PRESENTING ILLNESS:  Kimi Joaquin is a 23 y.o. female with history of schizophreniform disorder and depression who is admitted to BMU for IOP as step-down from recent inpatient psychiatric admission.    Reviewed notes since last seen by psychiatry.  Interacting appropriately in group.    Today, patient reports feeling well.  Feels less foggy and is looking forward to doing something fun.  Spoke about Valerian root and recommending trial without taking it.  Patient agreeable to decreasing dosage.  Denies acute depressed mood, denies SI.   Continues to have poor insight into state of health and recent admission.  Addressed questions and concerns.      Risk Parameters:  Patient reports no suicidal ideation  Patient reports no homicidal ideation  Patient reports no self-injurious behavior  Patient reports no violent behavior    Current psych meds  Continue Perseris 120mg SubQ every 28 days (last given )  Medication side effects:  None    Current Substance use  Alcohol : None  Nicotine:  None  Illicit's: None  Other Rx controlled substances (Ex-opiates, stimulants etc): None      PAST PSYCHIATRIC, MEDICAL, AND SOCIAL HISTORY REVIEWED  The patient's past medical, family and social history have been reviewed and updated as appropriate within the electronic medical record     MEDICAL REVIEW OF SYSTEMS  History obtained from the patient  General : NO chills or fever  Respiratory: NO cough, shortness of breath  Cardiovascular: NO chest pain, palpitations or racing heart  Gastrointestinal: NO nausea, vomiting, constipation or diarrhea  Neurological: NO confusion, dizziness, headaches or tremors  Psychiatric: please see HPI     ALL MEDICATIONS:    Current Outpatient  "Medications:     [START ON 9/26/2023] risperiDONE 120 mg sers, Inject 120 mg into the skin every 28 days., Disp: 1 each, Rfl: 1    ALLERGIES:  Review of patient's allergies indicates:  No Known Allergies    RELEVANT LABS/STUDIES:    Lab Results   Component Value Date    WBC 7.06 08/15/2023    HGB 12.9 08/15/2023    HCT 38.2 08/15/2023    MCV 88 08/15/2023     08/15/2023     BMP  Lab Results   Component Value Date     08/15/2023    K 3.4 (L) 08/15/2023     08/15/2023    CO2 24 08/15/2023    BUN 7 08/15/2023    CREATININE 0.9 08/15/2023    CALCIUM 9.5 08/15/2023    ANIONGAP 10 08/15/2023     Lab Results   Component Value Date    ALT 16 08/15/2023    AST 21 08/15/2023    ALKPHOS 68 08/15/2023    BILITOT 0.7 08/15/2023     Lab Results   Component Value Date    TSH 2.152 08/15/2023     No results found for: "LABA1C", "HGBA1C"    VITALS  defer to nursing note    PHYSICAL EXAM  General: well developed, well nourished  Neurologic:   Gait: Normal   Psychomotor signs:  No involuntary movements or tremor  AIMS: none    PSYCHIATRIC EXAM:     Mental Status Exam:  Arousal: alert  Sensorium/Orientation: oriented to grossly intact  Behavior/Cooperation: cooperative   Speech: slowed, monotone  Language: grossly intact  Mood: neutral   Affect: flat  Thought Process: goal-directed  Thought Content:   Auditory hallucinations: NO  Visual hallucinations: NO  Paranoia: YES     Delusions:  Not spontaneously elicited  Suicidal ideation: NO  Homicidal ideation: NO  Attention/Concentration:  intact  Memory:    Recent:   Impaired to some degree   Remote: Intact  Fund of Knowledge: Intact   Abstract reasoning: concrete  Insight: poor awareness of illness  Judgment: behavior is adequate to circumstances       IMPRESSION:    Kimi Joaquin is a 23 y.o. female with history of schizophreniform disorder and depression who is admitted to BMU for step-down care after recent inpatient psychiatric hospitalization.  Patient with " poor insight into recent admission and symptoms that prompted inpatient management.    Status/Progress:  Based on the examination today, the patient's problem(s) is/are adequately but not ideally controlled.  New problems have not been presented today.     DIAGNOSES:  No diagnosis found.    PLAN:    1) Continue BMU program with group and individual therapies.     2) Psych Med:  Continue Perseris 120mg SubQ every 28 days (last given 8/29)    Discussed with patient informed consent, risks vs. benefits, alternative treatments, side effect profile and the inherent unpredictability of individual responses to these treatments. Answered any questions patient may have had. The patient expresses understanding of the above and displays the capacity to agree with this current plan     3) Other: Labs/medical problems/ collateral- none needed        Adams Hare MD  [facility], PGY-IV  9/8/2023 9:22 AM

## 2023-09-11 ENCOUNTER — HOSPITAL ENCOUNTER (OUTPATIENT)
Dept: PSYCHIATRY | Facility: HOSPITAL | Age: 23
Discharge: HOME | End: 2023-09-11
Attending: STUDENT IN AN ORGANIZED HEALTH CARE EDUCATION/TRAINING PROGRAM
Payer: OTHER GOVERNMENT

## 2023-09-11 DIAGNOSIS — F20.81 SCHIZOPHRENIFORM DISORDER: Primary | ICD-10-CM

## 2023-09-11 PROCEDURE — 90853 GROUP PSYCHOTHERAPY: CPT

## 2023-09-11 PROCEDURE — 99232 SBSQ HOSP IP/OBS MODERATE 35: CPT | Mod: ,,, | Performed by: PSYCHIATRY & NEUROLOGY

## 2023-09-11 PROCEDURE — 99232 PR SUBSEQUENT HOSPITAL CARE,LEVL II: ICD-10-PCS | Mod: ,,, | Performed by: PSYCHIATRY & NEUROLOGY

## 2023-09-11 NOTE — PROGRESS NOTES
Group Psychotherapy (PhD/LCSW)    Site: [facility]    Clinical status of patient: Intensive Outpatient Program (IOP)    Date: 9/11/2023    Group Focus: Sleep 101    Length of service: 76001 - 45-50 minutes    Number of patients in attendance: 6    Referred by: CHENG    Target symptoms: Psychosis    Patient's response to treatment: Active Listening, Self-disclosure.    Progress toward goals: Progressing adequately    Interval History: SLEEP BASICS and SLEEP DIARY  Session focus was on psychoeducation on basic concepts about sleep including stages of sleep, reasons for sleep, and changes in sleep as we age. Session included discussion of sleep drive, the uses of sleep medication, and the efficacy of CBTi. Clinician introduced group to daily sleep diary.     Diagnosis:     ICD-10-CM ICD-9-CM   1. Schizophreniform disorder  F20.81 295.40     Plan: Continue treatment on [facility]

## 2023-09-11 NOTE — PROGRESS NOTES
[facility] PROGRESS NOTE    PATIENT NAME: Kimi Joaquin  MRN: [id number]  ENCOUNTER DATE:  2023 9:22 AM   SITE:  U unit, [facility]  ADMIT DATE:   Pt Name: Kimi Joaquin   : 2000   MRN: [id number]      HISTORY OF PRESENTING ILLNESS:  Kimi Joaquin is a 23 y.o. female with history of schizophreniform disorder and depression who is admitted to BMU for IOP as step-down from recent inpatient psychiatric admission.    Reviewed notes since last seen by psychiatry.  Interacting appropriately in group.    Today, patient reports overall doing well.  No acute issues over the weekend.  Shares she had a hard time going out and meeting people.  Enjoying the groups.  Stopped taking Valerian root over the weekend.  Sleeping well.  Goal of having increased energy.  Denies acute depressed mood.  Denies SI.  Continues to have poor insight into state of health and recent admission.  Addressed questions and concerns.      Risk Parameters:  Patient reports no suicidal ideation  Patient reports no homicidal ideation  Patient reports no self-injurious behavior  Patient reports no violent behavior    Current psych meds  Continue Perseris 120mg SubQ every 28 days (last given )  Medication side effects:  None    Current Substance use  Alcohol : None  Nicotine:  None  Illicit's: None  Other Rx controlled substances (Ex-opiates, stimulants etc): None      PAST PSYCHIATRIC, MEDICAL, AND SOCIAL HISTORY REVIEWED  The patient's past medical, family and social history have been reviewed and updated as appropriate within the electronic medical record     MEDICAL REVIEW OF SYSTEMS  History obtained from the patient  General : NO chills or fever  Respiratory: NO cough, shortness of breath  Cardiovascular: NO chest pain, palpitations or racing heart  Gastrointestinal: NO nausea, vomiting, constipation or diarrhea  Neurological: NO confusion, dizziness, headaches or tremors  Psychiatric: please see  "HPI     ALL MEDICATIONS:    Current Outpatient Medications:     [START ON 9/26/2023] risperiDONE 120 mg sers, Inject 120 mg into the skin every 28 days., Disp: 1 each, Rfl: 1    ALLERGIES:  Review of patient's allergies indicates:  No Known Allergies    RELEVANT LABS/STUDIES:    Lab Results   Component Value Date    WBC 7.06 08/15/2023    HGB 12.9 08/15/2023    HCT 38.2 08/15/2023    MCV 88 08/15/2023     08/15/2023     BMP  Lab Results   Component Value Date     08/15/2023    K 3.4 (L) 08/15/2023     08/15/2023    CO2 24 08/15/2023    BUN 7 08/15/2023    CREATININE 0.9 08/15/2023    CALCIUM 9.5 08/15/2023    ANIONGAP 10 08/15/2023     Lab Results   Component Value Date    ALT 16 08/15/2023    AST 21 08/15/2023    ALKPHOS 68 08/15/2023    BILITOT 0.7 08/15/2023     Lab Results   Component Value Date    TSH 2.152 08/15/2023     No results found for: "LABA1C", "HGBA1C"    VITALS  defer to nursing note    PHYSICAL EXAM  General: well developed, well nourished  Neurologic:   Gait: Normal   Psychomotor signs:  No involuntary movements or tremor  AIMS: none    PSYCHIATRIC EXAM:     Mental Status Exam:  Arousal: alert  Sensorium/Orientation: oriented to grossly intact  Behavior/Cooperation: cooperative   Speech: slowed, monotone  Language: grossly intact  Mood: "chill"  Affect: flat  Thought Process: goal-directed  Thought Content:   Auditory hallucinations: NO  Visual hallucinations: NO  Paranoia: YES     Delusions:  Not spontaneously elicited  Suicidal ideation: NO  Homicidal ideation: NO  Attention/Concentration:  intact  Memory:    Recent:   Impaired to some degree   Remote: Intact  Fund of Knowledge: Intact   Abstract reasoning: concrete  Insight: poor awareness of illness  Judgment: behavior is adequate to circumstances       IMPRESSION:    Kiim Joaquin is a 23 y.o. female with history of schizophreniform disorder and depression who is admitted to BMU for step-down care after recent inpatient " psychiatric hospitalization.  Patient with poor insight into recent admission and symptoms that prompted inpatient management.    Status/Progress:  Based on the examination today, the patient's problem(s) is/are adequately but not ideally controlled.  New problems have not been presented today.     DIAGNOSES:  No diagnosis found.    PLAN:    1) Continue BMU program with group and individual therapies.     2) Psych Med:  Continue Perseris 120mg SubQ every 28 days (last given 8/29)    Discussed with patient informed consent, risks vs. benefits, alternative treatments, side effect profile and the inherent unpredictability of individual responses to these treatments. Answered any questions patient may have had. The patient expresses understanding of the above and displays the capacity to agree with this current plan     3) Other: Labs/medical problems/ collateral- none needed        Adams Hare MD  [facility], PGY-IV  9/11/2023 9:22 AM

## 2023-09-11 NOTE — PROGRESS NOTES
Group Psychotherapy (PhD/LCSW)    Site: [facility]    Clinical status of patient: Intensive Outpatient Program (IOP)    Date: 9/11/2023    Group Focus: Distress Tolerance      Length of service: 34315 - 45-50 minutes    Number of patients in attendance: 8    Referred by: Behavioral Medicine Unit Treatment Team    Target symptoms: Psychosis    Patient's response to treatment: Active Listening, Self-disclosure.    Progress toward goals: Progressing adequately    Interval History: Session focus was Distress Tolerance:  TIP skill.  Patients were encouraged to use temperature, intense exercise, paced breathing, or paired muscle relaxation to reduce intensity of distress.    Diagnosis:     ICD-10-CM ICD-9-CM   1. Schizophreniform disorder  F20.81 295.40     Plan: Continue treatment on [facility]

## 2023-09-11 NOTE — PROGRESS NOTES
Group Psychotherapy (PhD/LCSW)    Site: [facility]    Clinical status of patient: Intensive Outpatient Program (IOP)    Date: 9/7/2023    Group Focus: Psychodynamic Processing    Length of service: 24946 - 60 minutes    Number of patients in attendance: 5    Referred by: CHENG    Target symptoms: Psychosis    Patient's response to treatment: Active Listening, Self-disclosure.    Progress toward goals: Progressing adequately    Interval History: The group was focused on mindfulness, meditation, and stress reduction skills. Group members participated in meditation activity and discussed progress related to their personal goals. This patient remained engaged and interacted with other group members.      Diagnosis:     ICD-10-CM ICD-9-CM   1. Schizophreniform disorder  F20.81 295.40     Plan: Continue treatment on [facility]

## 2023-09-11 NOTE — PLAN OF CARE
09/11/23 1615   Activity/Group Therapy Checklist   Group Goals/Reflection   Attendance Attended   Follows Direction Followed directions   Group Interactions/Observations Interacted appropriately;Alert;Sharing   Affect/Mood Range Normal range   Affect/Mood Display Appropriate   Goal Progression Progressing

## 2023-09-12 ENCOUNTER — HOSPITAL ENCOUNTER (OUTPATIENT)
Dept: PSYCHIATRY | Facility: HOSPITAL | Age: 23
Discharge: HOME | End: 2023-09-12
Attending: STUDENT IN AN ORGANIZED HEALTH CARE EDUCATION/TRAINING PROGRAM
Payer: OTHER GOVERNMENT

## 2023-09-12 DIAGNOSIS — F20.81 SCHIZOPHRENIFORM DISORDER: Primary | ICD-10-CM

## 2023-09-12 PROCEDURE — 90853 PR GROUP PSYCHOTHERAPY: ICD-10-PCS | Mod: ,,, | Performed by: PSYCHOLOGIST

## 2023-09-12 PROCEDURE — 90853 GROUP PSYCHOTHERAPY: CPT | Mod: ,,, | Performed by: PSYCHOLOGIST

## 2023-09-12 PROCEDURE — 90853 GROUP PSYCHOTHERAPY: CPT

## 2023-09-12 PROCEDURE — 99232 SBSQ HOSP IP/OBS MODERATE 35: CPT | Mod: ,,, | Performed by: STUDENT IN AN ORGANIZED HEALTH CARE EDUCATION/TRAINING PROGRAM

## 2023-09-12 PROCEDURE — 99232 PR SUBSEQUENT HOSPITAL CARE,LEVL II: ICD-10-PCS | Mod: ,,, | Performed by: STUDENT IN AN ORGANIZED HEALTH CARE EDUCATION/TRAINING PROGRAM

## 2023-09-12 NOTE — PROGRESS NOTES
Group Psychotherapy (PhD/LCSW)    Site: [facility]    Clinical status of patient: Intensive Outpatient Program (IOP)    Date: 9/12/2023    Group Focus: Interpersonal Effectiveness     Length of service: 19223 - 45-50 minutes    Number of patients in attendance: 7    Referred by: Behavioral Medicine Unit Treatment Team    Target symptoms: Psychosis    Patient's response to treatment: Active Listening, Self-disclosure.    Progress toward goals: Progressing adequately    Interval History: Session focus was Interpersonal Effectiveness: GIVE and FAST. Patients were introduced to skills to promote active listening, self-respect, and attendance to values. Patients were provided with opportunities to collaborate with others and role-play in session.    Diagnosis:     ICD-10-CM ICD-9-CM   1. Schizophreniform disorder  F20.81 295.40     Plan: Continue treatment on [facility]

## 2023-09-12 NOTE — PROGRESS NOTES
[facility] PROGRESS NOTE    PATIENT NAME: Kimi Joaquin  MRN: [id number]  ENCOUNTER DATE:  2023 9:22 AM   SITE:  U unit, [facility]  ADMIT DATE:   Pt Name: Kimi Joaquin   : 2000   MRN: [id number]      HISTORY OF PRESENTING ILLNESS:  Kimi Joaquin is a 23 y.o. female with history of schizophreniform disorder and depression who is admitted to BMU for IOP as step-down from recent inpatient psychiatric admission.    Reviewed notes since last seen by psychiatry.  Interacting appropriately in group.    Today, patient reports overall doing okay.  Slept 8 hours overnight and not feeling lethargic today.  Reports she is enjoying groups.  Planning to return to work following this group.  Denies acute depressed mood.  No SI.  Denies AVH.  Continues to have poor insight into state of health.  Addressed questions and concerns.      Risk Parameters:  Patient reports no suicidal ideation  Patient reports no homicidal ideation  Patient reports no self-injurious behavior  Patient reports no violent behavior    Current psych meds  Continue Perseris 120mg SubQ every 28 days (last given )  Medication side effects:  None    Current Substance use  Alcohol : None  Nicotine:  None  Illicit's: None  Other Rx controlled substances (Ex-opiates, stimulants etc): None      PAST PSYCHIATRIC, MEDICAL, AND SOCIAL HISTORY REVIEWED  The patient's past medical, family and social history have been reviewed and updated as appropriate within the electronic medical record     MEDICAL REVIEW OF SYSTEMS  History obtained from the patient  General : NO chills or fever  Respiratory: NO cough, shortness of breath  Cardiovascular: NO chest pain, palpitations or racing heart  Gastrointestinal: NO nausea, vomiting, constipation or diarrhea  Neurological: NO confusion, dizziness, headaches or tremors  Psychiatric: please see HPI     ALL MEDICATIONS:    Current Outpatient Medications:     [START ON 2023]  "risperiDONE 120 mg sers, Inject 120 mg into the skin every 28 days., Disp: 1 each, Rfl: 1    ALLERGIES:  Review of patient's allergies indicates:  No Known Allergies    RELEVANT LABS/STUDIES:    Lab Results   Component Value Date    WBC 7.06 08/15/2023    HGB 12.9 08/15/2023    HCT 38.2 08/15/2023    MCV 88 08/15/2023     08/15/2023     BMP  Lab Results   Component Value Date     08/15/2023    K 3.4 (L) 08/15/2023     08/15/2023    CO2 24 08/15/2023    BUN 7 08/15/2023    CREATININE 0.9 08/15/2023    CALCIUM 9.5 08/15/2023    ANIONGAP 10 08/15/2023     Lab Results   Component Value Date    ALT 16 08/15/2023    AST 21 08/15/2023    ALKPHOS 68 08/15/2023    BILITOT 0.7 08/15/2023     Lab Results   Component Value Date    TSH 2.152 08/15/2023     No results found for: "LABA1C", "HGBA1C"    VITALS  defer to nursing note    PHYSICAL EXAM  General: well developed, well nourished  Neurologic:   Gait: Normal   Psychomotor signs:  No involuntary movements or tremor  AIMS: none    PSYCHIATRIC EXAM:     Mental Status Exam:  Arousal: alert  Sensorium/Orientation: oriented to grossly intact  Behavior/Cooperation: cooperative   Speech: slowed, monotone  Language: grossly intact  Mood: "okay"  Affect: flat  Thought Process: goal-directed  Thought Content:   Auditory hallucinations: NO  Visual hallucinations: NO  Paranoia: YES     Delusions:  Not spontaneously elicited  Suicidal ideation: NO  Homicidal ideation: NO  Attention/Concentration:  intact  Memory:    Recent:   Impaired to some degree   Remote: Intact  Fund of Knowledge: Intact   Abstract reasoning: concrete  Insight: poor awareness of illness  Judgment: behavior is adequate to circumstances       IMPRESSION:    Kimi Joaquin is a 23 y.o. female with history of schizophreniform disorder and depression who is admitted to BMU for step-down care after recent inpatient psychiatric hospitalization.  Patient with poor insight into recent admission and " symptoms that prompted inpatient management.    Status/Progress:  Based on the examination today, the patient's problem(s) is/are adequately but not ideally controlled.  New problems have not been presented today.     DIAGNOSES:  No diagnosis found.    PLAN:    1) Continue BMU program with group and individual therapies.     2) Psych Med:  Continue Perseris 120mg SubQ every 28 days (last given 8/29)    Discussed with patient informed consent, risks vs. benefits, alternative treatments, side effect profile and the inherent unpredictability of individual responses to these treatments. Answered any questions patient may have had. The patient expresses understanding of the above and displays the capacity to agree with this current plan     3) Other: Labs/medical problems/ collateral- none needed        Adams Hare MD  [facility], PGY-IV  9/12/2023 9:22 AM

## 2023-09-12 NOTE — PLAN OF CARE
"   09/12/23 1535   Activity/Group Therapy Checklist   Group Other (Comments)  (Three Good People)   Attendance Attended   Follows Direction Followed directions   Group Interactions/Observations Interacted appropriately;Alert;Sharing;Supportive   Affect/Mood Range Normal range   Affect/Mood Display Appropriate   Goal Progression Progressing      facilitated group. SW discussed with patient strength's based activity called, " Three Good People. Pt identified strengths in three people : fictional character, inspiring person and self. Pt's also identified how these strengths were used to overcome challenges and obstacles.   "

## 2023-09-12 NOTE — PLAN OF CARE
09/12/23 1514   Activity/Group Therapy Checklist   Group Activity   Attendance Attended   Follows Direction Followed directions   Group Interactions/Observations Interacted appropriately;Alert;Sharing   Affect/Mood Range Normal range   Affect/Mood Display Appropriate   Goal Progression Progressing

## 2023-09-13 ENCOUNTER — HOSPITAL ENCOUNTER (OUTPATIENT)
Dept: PSYCHIATRY | Facility: HOSPITAL | Age: 23
Discharge: HOME | End: 2023-09-13
Payer: OTHER GOVERNMENT

## 2023-09-13 VITALS
TEMPERATURE: 98 F | DIASTOLIC BLOOD PRESSURE: 60 MMHG | RESPIRATION RATE: 18 BRPM | HEART RATE: 80 BPM | SYSTOLIC BLOOD PRESSURE: 107 MMHG

## 2023-09-13 DIAGNOSIS — F20.81 SCHIZOPHRENIFORM DISORDER: Primary | ICD-10-CM

## 2023-09-13 PROCEDURE — 90853 GROUP PSYCHOTHERAPY: CPT

## 2023-09-13 PROCEDURE — 90853 GROUP PSYCHOTHERAPY: CPT | Mod: ,,, | Performed by: PSYCHOLOGIST

## 2023-09-13 PROCEDURE — 90853 PR GROUP PSYCHOTHERAPY: ICD-10-PCS | Mod: ,,, | Performed by: PSYCHOLOGIST

## 2023-09-13 NOTE — PATIENT CARE CONFERENCE
Presenting Problem and History:    Kimi Joaquin is a 23 y.o. female with history of Schizophreniform disorder and depression who is admitted to BMU for step down care following recent inpatient psychiatric admission.  Patient admitted to [facility] 8/15-8/30 for SI and bizarre behavior and was discharged on Perseris.     On interview today, patient with poor memory and insight into recent admission.  Speech is slow and often repeats questions asked.  She recounts having trouble sleeping for 2 nights so her boss brought her to the emergency room.  When asked about circumstances only endorses trouble sleeping and low energy.  Asked about messages that might have been sent, and patient reports she got a new phone number and thinks people might have gotten her old phone number from [employer].  Reports overall doing well since discharge but focuses on frustration with having lower energy when boxing.  She denies acute depressed mood.  Future oriented and denies SI/HI/AVH.  Denies symptoms consistent with debra (DIGFAST).  She denies recent acute stressors or life changes.  Notes the primary time she gets anxious is worry about something happening to her when she is at the gym or out in the city due to general safety concerns.  Notes occasional alcohol use with at least a glass of wine weekly.  Denies substance use - denies cannabis, hallucinogens, stimulants.  Reports taking a number of supplements including Valerian root, ashwagandha, and flax.  Caffeine is 2 cups of coffee with mushroom extract daily.  Discussed IOP with patient.  She is uncertain what exactly she would like to work on while here.  Addressed questions and concerns.         Medications:    Psych Med:  Continue Perseris 120mg SubQ every 28 days (last given 8/29)       Diagnoses:    Unspecified psychotic disorder    Progress:    Patient was staffed by Team. Pt has been cooperative and appropriate in the group. Pt is very quiet and answers questions when spoken  to but not very forth coming. Team will continue to monitor pt's progress in program.       Plan of Care:    Patient will continue [facility] program.     Aftercare/Follow ups:  Med Management: TBD  Psychotherapy:  TBD    Staff present:  MD Blaise Alves MD Eric Wilde, MD, Resident   Amarilis Fields, MS3  Johny Osorio, PhD, Fellow  CATRACHITA Valenzuela, DANYA VeraW

## 2023-09-13 NOTE — PROGRESS NOTES
Group Psychotherapy (PhD/LCSW)    Site: [facility]    Clinical status of patient: Intensive Outpatient Program (IOP)    Date: 9/13/2023    Group Focus: Mindfulness      Length of service: 65134 - 45-50 minutes    Number of patients in attendance: 7    Referred by: Behavioral Medicine Unit Treatment Team    Target symptoms: Psychosis    Patient's response to treatment: Active Listening, Self-disclosure.    Progress toward goals: Progressing adequately    Interval History: Session focus was Mindfulness: Mindfulness 'What' Skills. Patient's were introduced to mindfulness what skills of observing, describing, participating. Patient's identified the value of each skill, how to use each skill, and practiced each skill in session.     Diagnosis:     ICD-10-CM ICD-9-CM   1. Schizophreniform disorder  F20.81 295.40     Plan: Continue treatment on [facility]

## 2023-09-13 NOTE — PLAN OF CARE
09/13/23 1513   Activity/Group Therapy Checklist   Group Other (Comments)  (Processing group)   Attendance Attended   Follows Direction Followed directions   Group Interactions/Observations Interacted appropriately;Alert;Sharing;Supportive   Affect/Mood Range Normal range   Affect/Mood Display Appropriate   Goal Progression Progressing

## 2023-09-13 NOTE — PLAN OF CARE
09/13/23 1320   Activity/Group Therapy Checklist   Group Activity   Attendance Attended   Follows Direction Followed directions   Group Interactions/Observations Interacted appropriately;Alert;Sharing   Affect/Mood Range Normal range   Affect/Mood Display Appropriate   Goal Progression Progressing

## 2023-09-14 ENCOUNTER — HOSPITAL ENCOUNTER (OUTPATIENT)
Dept: PSYCHIATRY | Facility: HOSPITAL | Age: 23
Discharge: HOME | End: 2023-09-14
Attending: STUDENT IN AN ORGANIZED HEALTH CARE EDUCATION/TRAINING PROGRAM
Payer: OTHER GOVERNMENT

## 2023-09-14 DIAGNOSIS — F20.81 SCHIZOPHRENIFORM DISORDER: Primary | ICD-10-CM

## 2023-09-14 PROCEDURE — 90853 PR GROUP PSYCHOTHERAPY: ICD-10-PCS | Mod: ,,, | Performed by: PSYCHOLOGIST

## 2023-09-14 PROCEDURE — 90853 GROUP PSYCHOTHERAPY: CPT | Mod: ,,, | Performed by: PSYCHOLOGIST

## 2023-09-14 PROCEDURE — 99232 SBSQ HOSP IP/OBS MODERATE 35: CPT | Mod: ,,, | Performed by: STUDENT IN AN ORGANIZED HEALTH CARE EDUCATION/TRAINING PROGRAM

## 2023-09-14 PROCEDURE — 90853 GROUP PSYCHOTHERAPY: CPT

## 2023-09-14 PROCEDURE — 99232 PR SUBSEQUENT HOSPITAL CARE,LEVL II: ICD-10-PCS | Mod: ,,, | Performed by: STUDENT IN AN ORGANIZED HEALTH CARE EDUCATION/TRAINING PROGRAM

## 2023-09-14 NOTE — PLAN OF CARE
09/14/23 1235   Activity/Group Therapy Checklist   Group Other (Comments)  (Creative Session)   Attendance Attended   Follows Direction Followed directions   Group Interactions/Observations Interacted appropriately;Alert;Sharing;Supportive   Affect/Mood Range Normal range   Affect/Mood Display Appropriate   Goal Progression Progressing      facilitated group. SW introduced dance movement therapy to group and discussed the history of dance movement therapy. SW discussed the benefits of dance therapy and different types of dance techniques. SW practiced two dance techniques with patients : mirroring and free flow.

## 2023-09-14 NOTE — PROGRESS NOTES
[facility] PROGRESS NOTE    PATIENT NAME: Kimi Joaquin  MRN: [id number]  ENCOUNTER DATE:  2023 9:22 AM   SITE:  U unit, [facility]  ADMIT DATE:   Pt Name: Kimi Joaquin   : 2000   MRN: [id number]      HISTORY OF PRESENTING ILLNESS:  Kimi Joaquin is a 23 y.o. female with history of schizophreniform disorder and depression who is admitted to BMU for IOP as step-down from recent inpatient psychiatric admission.    Reviewed notes since last seen by psychiatry.  Interacting appropriately in group.    Today, patient reports doing well.  Looking forward to getting back to work.  Shares she just signed up for college classes.  Slept well but feeling lethargic this morning.  Hopeful to be able to make friends.  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  Denies acute depressed mood.  No SI/AVH.  Continues to have limited insight into state of health.  Addressed questions and concerns related to medication.      Risk Parameters:  Patient reports no suicidal ideation  Patient reports no homicidal ideation  Patient reports no self-injurious behavior  Patient reports no violent behavior    Current psych meds  Continue Perseris 120mg SubQ every 28 days (last given )  Medication side effects:  None    Current Substance use  Alcohol : None  Nicotine:  None  Illicit's: None  Other Rx controlled substances (Ex-opiates, stimulants etc): None      PAST PSYCHIATRIC, MEDICAL, AND SOCIAL HISTORY REVIEWED  The patient's past medical, family and social history have been reviewed and updated as appropriate within the electronic medical record     MEDICAL REVIEW OF SYSTEMS  History obtained from the patient  General : NO chills or fever  Respiratory: NO cough, shortness of breath  Cardiovascular: NO chest pain, palpitations or racing heart  Gastrointestinal: NO nausea, vomiting, constipation or diarrhea  Neurological: NO confusion, dizziness, headaches or tremors  Psychiatric: please  "see HPI     ALL MEDICATIONS:    Current Outpatient Medications:     [START ON 9/26/2023] risperiDONE 120 mg sers, Inject 120 mg into the skin every 28 days., Disp: 1 each, Rfl: 1    ALLERGIES:  Review of patient's allergies indicates:  No Known Allergies    RELEVANT LABS/STUDIES:    Lab Results   Component Value Date    WBC 7.06 08/15/2023    HGB 12.9 08/15/2023    HCT 38.2 08/15/2023    MCV 88 08/15/2023     08/15/2023     BMP  Lab Results   Component Value Date     08/15/2023    K 3.4 (L) 08/15/2023     08/15/2023    CO2 24 08/15/2023    BUN 7 08/15/2023    CREATININE 0.9 08/15/2023    CALCIUM 9.5 08/15/2023    ANIONGAP 10 08/15/2023     Lab Results   Component Value Date    ALT 16 08/15/2023    AST 21 08/15/2023    ALKPHOS 68 08/15/2023    BILITOT 0.7 08/15/2023     Lab Results   Component Value Date    TSH 2.152 08/15/2023     No results found for: "LABA1C", "HGBA1C"    VITALS  defer to nursing note    PHYSICAL EXAM  General: well developed, well nourished  Neurologic:   Gait: Normal   Psychomotor signs:  No involuntary movements or tremor  AIMS: none    PSYCHIATRIC EXAM:     Mental Status Exam:  Arousal: alert  Sensorium/Orientation: oriented to grossly intact  Behavior/Cooperation: cooperative   Speech: slowed, monotone  Language: grossly intact  Mood: "lethargic"  Affect: flat  Thought Process: goal-directed  Thought Content:   Auditory hallucinations: NO  Visual hallucinations: NO  Paranoia: YES     Delusions:  Not spontaneously elicited  Suicidal ideation: NO  Homicidal ideation: NO  Attention/Concentration:  intact  Memory:    Recent:   Impaired to some degree   Remote: Intact  Fund of Knowledge: Intact   Abstract reasoning: concrete  Insight: poor awareness of illness  Judgment: behavior is adequate to circumstances       IMPRESSION:    Kimi Joaquin is a 23 y.o. female with history of schizophreniform disorder and depression who is admitted to BMU for step-down care after recent " inpatient psychiatric hospitalization.  Patient with poor insight into recent admission and symptoms that prompted inpatient management.    Status/Progress:  Based on the examination today, the patient's problem(s) is/are adequately but not ideally controlled.  New problems have not been presented today.     DIAGNOSES:  No diagnosis found.    PLAN:    1) Continue BMU program with group and individual therapies.     2) Psych Med:  Continue Perseris 120mg SubQ every 28 days (last given 8/29, next due 9/26), consider change to 90mg dose    Discussed with patient informed consent, risks vs. benefits, alternative treatments, side effect profile and the inherent unpredictability of individual responses to these treatments. Answered any questions patient may have had. The patient expresses understanding of the above and displays the capacity to agree with this current plan     3) Other: Labs/medical problems/ collateral- none needed        Adams Hare MD  [facility], PGY-IV  9/14/2023 9:22 AM

## 2023-09-14 NOTE — PROGRESS NOTES
Group Psychotherapy (PhD/LCSW)    Site: [facility]    Clinical status of patient: Intensive Outpatient Program (IOP)    Date: 9/14/2023    Group Focus: Emotion Regulation     Length of service: 68174 - 45-50 minutes    Number of patients in attendance: 8    Referred by: Behavioral Medicine Unit Treatment Team    Target symptoms: Psychosis    Patient's response to treatment: Active Listening, Self-disclosure.    Progress toward goals: Progressing adequately    Interval History: Session focus was Emotion Regulation: Check the Facts.  Patients were encouraged to understand what their emotions do for them (motivate them to action, communicate to themselves and others).  They were encouraged to check the facts to ensure their emotion intensity fits the situation, and filled out worksheets in session.    Diagnosis:     ICD-10-CM ICD-9-CM   1. Schizophreniform disorder  F20.81 295.40     Plan: Continue treatment on [facility]

## 2023-09-15 ENCOUNTER — HOSPITAL ENCOUNTER (OUTPATIENT)
Dept: PSYCHIATRY | Facility: HOSPITAL | Age: 23
Discharge: HOME | End: 2023-09-15
Payer: OTHER GOVERNMENT

## 2023-09-15 VITALS
DIASTOLIC BLOOD PRESSURE: 61 MMHG | TEMPERATURE: 98 F | RESPIRATION RATE: 18 BRPM | SYSTOLIC BLOOD PRESSURE: 112 MMHG | HEART RATE: 75 BPM

## 2023-09-15 DIAGNOSIS — F20.81 SCHIZOPHRENIFORM DISORDER: Primary | ICD-10-CM

## 2023-09-15 PROCEDURE — 90853 GROUP PSYCHOTHERAPY: CPT

## 2023-09-15 NOTE — PLAN OF CARE
09/15/23 1410   Activity/Group Therapy Checklist   Group Meditation/Relaxation   Attendance Attended   Group Interactions/Observations Interacted appropriately;Alert;Sharing;Supportive   Affect/Mood Range Normal range   Affect/Mood Display Appropriate   Goal Progression Progressing     SW facilitated stretch/ relaxation group.

## 2023-09-15 NOTE — PLAN OF CARE
09/15/23 1411   Activity/Group Therapy Checklist   Group Other (Comments)  (Processing Group)   Attendance Attended   Follows Direction Followed directions   Group Interactions/Observations Interacted appropriately;Alert;Sharing;Supportive   Affect/Mood Range Normal range   Affect/Mood Display Appropriate   Goal Progression Progressing

## 2023-09-18 ENCOUNTER — HOSPITAL ENCOUNTER (OUTPATIENT)
Dept: PSYCHIATRY | Facility: HOSPITAL | Age: 23
Discharge: HOME | End: 2023-09-18
Payer: OTHER GOVERNMENT

## 2023-09-18 DIAGNOSIS — F20.81 SCHIZOPHRENIFORM DISORDER: ICD-10-CM

## 2023-09-18 DIAGNOSIS — F29 PSYCHOSIS, UNSPECIFIED PSYCHOSIS TYPE: Primary | ICD-10-CM

## 2023-09-18 PROCEDURE — 99238 HOSP IP/OBS DSCHRG MGMT 30/<: CPT | Mod: ,,, | Performed by: PSYCHIATRY & NEUROLOGY

## 2023-09-18 PROCEDURE — 90853 GROUP PSYCHOTHERAPY: CPT

## 2023-09-18 PROCEDURE — 99238 PR HOSPITAL DISCHARGE DAY,<30 MIN: ICD-10-PCS | Mod: ,,, | Performed by: PSYCHIATRY & NEUROLOGY

## 2023-09-18 NOTE — PLAN OF CARE
09/18/23 1418   Activity/Group Therapy Checklist   Group Other (Comments)  (Self Care)   Attendance Attended   Follows Direction Followed directions   Group Interactions/Observations Interacted appropriately;Alert;Sharing;Supportive   Affect/Mood Range Normal range   Affect/Mood Display Appropriate   Goal Progression Progressing     SW facilitated self care group. SW discussed activity Nurturing VS. Depleting activities and helped pts to identify daily activities and score them to determine the needs for balance in their lives.

## 2023-09-18 NOTE — DISCHARGE SUMMARY
[facility] DISCHARGE SUMMARY    Discharge Date: 2023 9:08 AM   Pt Name: Kimi Joaquin   : 2000   MRN: [id number]     Admit Date: 2023      SUBJECTIVE:  Patient is a 23 y.o. old, female, with past psychiatric history of schizophreniform disorder and depression, admitted to [facility] for step down care following recent inpatient psychiatric admission.    Program course-   - Engaged well with groups and treatment team.  - PHQ-9 and AYAD-7 consistent with time of admission without worsening.  - Perseris to be decreased from 120mg to 90mg SubQ every 28 days at next dose, due 23.    Today, patient reports overall doing well and that she had a good weekend.  Reports she has found the program to be beneficial.  Notes she has scheduled follow-up with her outpatient psychiatrist Dr. Fox and would also like to establish with a therapist.  Reflects that she found it to be beneficial when seeing one in the past.  No delusions elicited spontaneously.  She is future oriented and focused on returning to work.  Denies acute depressed mood.  Denies SI/HI/AVH.  Addressed questions and concerns.      PATIENT HEALTH QUESTIONNAIRE-9 ( P H Q - 9 )- DAY OF DISCHARGE    Over the last 2 weeks, how often have you been bothered by any of the following problems?  0-Not at all  1- Several days  2- More than half the days  3- Nearly every day    Little interest or pleasure in doing things 0  Feeling down, depressed, or hopeless 1  Trouble falling or staying asleep, or sleeping too much 0  Feeling tired or having little energy 1  Poor appetite or overeating 1  Feeling bad about yourself -- or that you are a failure or have let yourself or your family down 2  Trouble concentrating on things, such as reading the newspaper or watching television 0  Moving or speaking so slowly that other people could have noticed? Or the opposite -- being so fidgety or restless that you have been moving around a lot more than  usual 0  Thoughts that you would be better off dead or of hurting yourself in some way 0    Total Score: 5    If you checked off any problems, how difficult have these problems made it for you to do your  work, take care of things at home, or get along with other people?  Not difficult at all  Somewhat difficult  Very difficult  Extremely difficult    Developed by Ly Whatley, Rebeca Garay, Javan Alvarado and colleagues, with an educational kalia from  [employer] No permission required to reproduce, translate, display or distribute.    PHQ-9 Patient Depression Questionnaire Explanation  Interpretation of Total Score  Total Score Depression Severity  1-4 Minimal depression  5-9 Mild depression  10-14 Moderate depression  15-19 Moderately severe depression  20-27 Severe depression    PHQ9 Copyright © [employer] All rights reserved. Reproduced with permission. PRIME-MD ® is a  trademark of [employer]  [id number] 10-       GENERALIZED ANXIETY DISORDER SCALE (AYAD -7)    Over the last two weeks, how often have you been bothered by the following problems?  0-Not at all  1- Several days  2- More than half the days  3- Nearly every day    1. Feeling nervous, anxious, or on edge-1  2. Not being able to stop or control worrying-1  3. Worrying too much about different things- 1  4. Trouble relaxing-0  5. Being so restless that it is hard to sit still- 0  6. Becoming easily annoyed or irritable- 0  7. Feeling afraid, as if something awful  might happen- 1    If you checked any problems, how difficult have they made it for you to do your work, take care of things at home, or get along with other people?  Not difficult at all/ Somewhat difficult/  Very difficult/  Extremely difficult    Scoring AYAD-7 Anxiety Severity =  4/21  This is calculated by assigning scores of 0, 1, 2, and 3 to the response categories, respectively, of not at all, several days, more than half the days, and nearly every day.  "AYAD-7 total score for the seven items ranges from 0 to 21.  0-4: minimal anxiety  5-9: mild anxiety  10-14: moderate anxiety  15-21: severe anxiety    Source: Primary Care Evaluation of Mental Disorders Patient Health Questionnaire (PRIME-MD-PHQ). The PHQ was developed by Ly Whatley, Rebeca Garay, Javan Alvarado, and colleagues. For research information, contact Dr. Whatley at [email or web address]. PRIME-MD® is a trademark of [employer]. Copyright© 1999 [employer] All rights reserved. Reproduced with permission     Risk Parameters:  Patient reports no suicidal ideation  Patient reports no homicidal ideation  Patient reports no self-injurious behavior  Patient reports no violent behavior    Allergies:   Review of patient's allergies indicates:  No Known Allergies      Current Medications:   Perseris 120mg SubQ every 28 days (last given 8/29; next due 9/26)        OBJECTIVE:   Vitals (Most Recent)  There were no vitals filed for this visit.; defer to nursing note      Labs/Imaging/Studies:   No results found for this or any previous visit (from the past 48 hour(s)).   No results found for: "PHENYTOIN", "PHENOBARB", "VALPROATE", "CBMZ"    Mental Status Exam:  General Appearance: appears stated age, well developed and nourished, adequately groomed and appropriately dressed, in no acute distress  Behavior: normal, cooperative  Involuntary Movements and Motor Activity: no abnormal involuntary movements noted; no tics, no tremors, no akathisia, no dystonia, no evidence of tardive dyskinesia; no psychomotor agitation or retardation  Gait and Station: intact, normal gait and station, ambulates without assistance  Speech and Language: normal rate, rhythm, volume, tone, and pitch, fluent English, repeats words and phrases, no word finding difficulties are noted  Mood: "good"  Affect: blunted  Thought Process and Associations: linear, goal-directed, organized, no loosening of associations  Thought Content and " Perceptions:: no suicidal or homicidal ideation, no auditory or visual hallucinations, no paranoid ideation, no ideas of reference, no evidence of delusions or psychosis  Sensorium and Orientation: intact; alert with clear sensorium; oriented fully to person, place, time and situation  Recent and Remote Memory: grossly intact  Attention and Concentration: grossly intact, attentive to the conversation and not readily distractible  Fund of Knowledge: grossly intact, used appropriate vocabulary and demonstrated an awareness of current events, consistent with educational level achieved  Insight: intact, demonstrates awareness of situation  Judgment: intact, behavior is adequate/appropriate to the circumstances, compliant with health provider's recommendations and instructions      AIMS: 0/36    ASSESSMENT/PLAN:   General Assessment:  Patient is a 23 y.o., female, [facility] as step down from recent inpatient psychiatric hospitalization.  Patient was admitted to [facility] 8/15-8/30 for SI and bizarre behavior and was discharged on Perseris.  Initially patient with poor insight into hospitalization, flat affect, and paranoia.  Patient also reported to have been taking supra therapeutic dose of valerian root for an extended time period.  She engaged well with groups and treatment team.  While she continues to have limited insight into reasons for recent hospitalization, she understands current situation and behavior has been appropriate to circumstances.  She ceased taking scheduled valerian root.  Discussed medications and patient agreeable to trying decreased dose of Perseris when next due given noted low energy.  At time of discharge, no delusions elicited spontaneously.  She is future oriented and denies SI/HI/AVH.  She is focused on return to work and has close follow-up arranged with her outpatient psychiatrist (Dr. Fox).  Patient was also referred for follow-up with [facility]  [place].      Diagnosis:  Unspecified psychotic disorder     Plan:  1. Completed [facility] treatment with moderate-minimal improvement in presenting symptoms and was encouraged to attend after care groups for better transition to out pt care.     2. Psych meds  Continue current med regimen, tolerating well with out any side effects. Pt provided with enough refills until follow up appointment.  For Psychosis-- Perseris 90mg SubQ every 28 days (next due 9/26/23)  Recommend screening labs with outpatient provider or when next receiving HANLEY.  Discussed with patient and provided with written note of recommended labs.  HbA1c, Lipid panel, Prolactin level  Discussed with patient informed consent, risks vs. benefits, alternative treatments, side effect profile and the inherent unpredictability of individual responses to these treatments. Answered any questions patient may have had. The patient expresses understanding of the above and displays the capacity to  agree with this.     3. Patient Discharge Instructions and informed to:-  Follow up regularly with [facility] appointments for further psychiatric care and management. Please see SW note for after care appointments  Refrain from using excessive alcohol, avoid any illicit substances and or abuse of prescription medications, as this may worsen mood and may be detrimental to health.  Call the crisis line at: [phone] for help in a crisis and emergent situations and present to ED for any worsening of psychiatric symptoms or any SI/HI/AVH    Adams Hare MD  [facility], PGY-IV  9/18/2023 9:08 AM

## 2023-09-18 NOTE — PLAN OF CARE
09/18/23 1403   Activity/Group Therapy Checklist   Group Goals/Reflection   Attendance Attended   Follows Direction Followed directions   Group Interactions/Observations Interacted appropriately;Alert;Sharing   Affect/Mood Range Normal range   Affect/Mood Display Appropriate   Goal Progression Progressing

## 2023-09-18 NOTE — PROGRESS NOTES
Group Psychotherapy (PhD/LCSW)    Site: [facility]    Clinical status of patient: Intensive Outpatient Program (IOP)    Date: 9/18/2023    Group Focus: Distress Tolerance     Length of service: 27067 - 45-50 minutes    Number of patients in attendance: 7    Referred by: Behavioral Medicine Unit Treatment Team    Target symptoms: Psychosis    Patient's response to treatment: Active Listening, Self-disclosure.    Progress toward goals: Progressing adequately    Interval History: Session focus was Distress Tolerance:  Distract with ACCEPTS. Patients were encouraged to use activities, contributing, comparisons, different emotions, pushing away, other thoughts, and sensations to reduce intensity of distress.      Diagnosis:     ICD-10-CM ICD-9-CM   1. Psychosis, unspecified psychosis type  F29 298.9     Plan: Continue treatment on [facility]

## 2023-09-19 NOTE — PROGRESS NOTES
Group Psychotherapy (PhD/LCSW)     Site: [facility]     Clinical status of patient: Intensive Outpatient Program (IOP)     Date: 9/18/2023     Group Focus: Sleep 101     Length of service: 84968 - 45-50 minutes     Number of patients in attendance: 5     Referred by: Behavioral Medicine Unit Treatment Team     Target symptoms: Psychosis     Patient's response to treatment: Active Listening     Progress toward goals: Progressing adequately     Interval History: Session focus was on stimulus control and sleep compression. Clinician and patient discussed associating beds with wakefulness and how to disrupt the connection. Clinician also discussed the use of sleep compression to improve sleep quality and stimulate sleep drive. Patients reviewed factors contributing to sleep hygiene.  Patients reviewed sleep diaries and strategies for implementing stimulus control and sleep compression.      Diagnosis:       ICD-10-CM ICD-9-CM   1. Psychosis, unspecified psychosis type  F29 298.9      Plan: Continue treatment on [facility]     Mona Gomez M.S.  Psychology Doctoral Intern  [facility]

## 2023-11-20 PROBLEM — Z13.9 ENCOUNTER FOR MEDICAL SCREENING EXAMINATION: Status: RESOLVED | Noted: 2023-08-16 | Resolved: 2023-11-20
